# Patient Record
Sex: FEMALE | Race: BLACK OR AFRICAN AMERICAN | Employment: FULL TIME | URBAN - METROPOLITAN AREA
[De-identification: names, ages, dates, MRNs, and addresses within clinical notes are randomized per-mention and may not be internally consistent; named-entity substitution may affect disease eponyms.]

---

## 2017-10-27 ENCOUNTER — TELEPHONE (OUTPATIENT)
Dept: BARIATRICS/WEIGHT MGMT | Age: 42
End: 2017-10-27

## 2018-03-30 ENCOUNTER — OFFICE VISIT (OUTPATIENT)
Dept: BARIATRICS/WEIGHT MGMT | Age: 43
End: 2018-03-30
Payer: MEDICARE

## 2018-03-30 VITALS
HEART RATE: 80 BPM | WEIGHT: 293 LBS | HEIGHT: 63 IN | BODY MASS INDEX: 51.91 KG/M2 | DIASTOLIC BLOOD PRESSURE: 76 MMHG | SYSTOLIC BLOOD PRESSURE: 126 MMHG | RESPIRATION RATE: 20 BRPM

## 2018-03-30 DIAGNOSIS — K21.9 GASTROESOPHAGEAL REFLUX DISEASE WITHOUT ESOPHAGITIS: ICD-10-CM

## 2018-03-30 DIAGNOSIS — E66.01 MORBID OBESITY (HCC): ICD-10-CM

## 2018-03-30 DIAGNOSIS — G47.33 OBSTRUCTIVE SLEEP APNEA: Primary | ICD-10-CM

## 2018-03-30 PROCEDURE — G8417 CALC BMI ABV UP PARAM F/U: HCPCS | Performed by: SURGERY

## 2018-03-30 PROCEDURE — 99214 OFFICE O/P EST MOD 30 MIN: CPT | Performed by: SURGERY

## 2018-03-30 PROCEDURE — 1036F TOBACCO NON-USER: CPT | Performed by: SURGERY

## 2018-03-30 PROCEDURE — G8427 DOCREV CUR MEDS BY ELIG CLIN: HCPCS | Performed by: SURGERY

## 2018-03-30 PROCEDURE — G8484 FLU IMMUNIZE NO ADMIN: HCPCS | Performed by: SURGERY

## 2018-04-04 ENCOUNTER — NURSE ONLY (OUTPATIENT)
Dept: BARIATRICS/WEIGHT MGMT | Age: 43
End: 2018-04-04

## 2018-04-23 ENCOUNTER — OFFICE VISIT (OUTPATIENT)
Dept: INTERNAL MEDICINE | Age: 43
End: 2018-04-23
Payer: MEDICARE

## 2018-04-23 VITALS
OXYGEN SATURATION: 96 % | SYSTOLIC BLOOD PRESSURE: 137 MMHG | DIASTOLIC BLOOD PRESSURE: 85 MMHG | HEART RATE: 88 BPM | WEIGHT: 293 LBS | BODY MASS INDEX: 68.75 KG/M2

## 2018-04-23 DIAGNOSIS — N39.46 URINARY INCONTINENCE, MIXED: ICD-10-CM

## 2018-04-23 DIAGNOSIS — E66.01 MORBID OBESITY WITH BMI OF 60.0-69.9, ADULT (HCC): ICD-10-CM

## 2018-04-23 DIAGNOSIS — M25.562 ACUTE PAIN OF LEFT KNEE: Primary | ICD-10-CM

## 2018-04-23 DIAGNOSIS — E11.9 TYPE 2 DIABETES MELLITUS WITHOUT COMPLICATION, WITHOUT LONG-TERM CURRENT USE OF INSULIN (HCC): ICD-10-CM

## 2018-04-23 LAB — HBA1C MFR BLD: 6.5 %

## 2018-04-23 PROCEDURE — 99203 OFFICE O/P NEW LOW 30 MIN: CPT | Performed by: INTERNAL MEDICINE

## 2018-04-23 PROCEDURE — G8417 CALC BMI ABV UP PARAM F/U: HCPCS | Performed by: INTERNAL MEDICINE

## 2018-04-23 PROCEDURE — 3044F HG A1C LEVEL LT 7.0%: CPT | Performed by: INTERNAL MEDICINE

## 2018-04-23 PROCEDURE — 83036 HEMOGLOBIN GLYCOSYLATED A1C: CPT | Performed by: INTERNAL MEDICINE

## 2018-04-23 PROCEDURE — 1036F TOBACCO NON-USER: CPT | Performed by: INTERNAL MEDICINE

## 2018-04-23 PROCEDURE — 2022F DILAT RTA XM EVC RTNOPTHY: CPT | Performed by: INTERNAL MEDICINE

## 2018-04-23 PROCEDURE — G8427 DOCREV CUR MEDS BY ELIG CLIN: HCPCS | Performed by: INTERNAL MEDICINE

## 2018-04-23 RX ORDER — OXYCODONE HYDROCHLORIDE AND ACETAMINOPHEN 5; 325 MG/1; MG/1
1 TABLET ORAL EVERY 4 HOURS PRN
Status: ON HOLD | COMMUNITY
End: 2019-01-22 | Stop reason: HOSPADM

## 2018-04-23 ASSESSMENT — PATIENT HEALTH QUESTIONNAIRE - PHQ9
1. LITTLE INTEREST OR PLEASURE IN DOING THINGS: 0
2. FEELING DOWN, DEPRESSED OR HOPELESS: 0
SUM OF ALL RESPONSES TO PHQ QUESTIONS 1-9: 0
SUM OF ALL RESPONSES TO PHQ9 QUESTIONS 1 & 2: 0

## 2018-04-23 ASSESSMENT — ENCOUNTER SYMPTOMS
GASTROINTESTINAL NEGATIVE: 1
EYES NEGATIVE: 1
ALLERGIC/IMMUNOLOGIC NEGATIVE: 1

## 2018-05-02 ENCOUNTER — OFFICE VISIT (OUTPATIENT)
Dept: PULMONOLOGY | Age: 43
End: 2018-05-02
Payer: MEDICARE

## 2018-05-02 VITALS
HEART RATE: 89 BPM | SYSTOLIC BLOOD PRESSURE: 126 MMHG | WEIGHT: 293 LBS | OXYGEN SATURATION: 97 % | RESPIRATION RATE: 16 BRPM | BODY MASS INDEX: 51.91 KG/M2 | TEMPERATURE: 97.1 F | DIASTOLIC BLOOD PRESSURE: 83 MMHG | HEIGHT: 63 IN

## 2018-05-02 DIAGNOSIS — K21.9 GASTROESOPHAGEAL REFLUX DISEASE, ESOPHAGITIS PRESENCE NOT SPECIFIED: ICD-10-CM

## 2018-05-02 DIAGNOSIS — G47.33 OSA (OBSTRUCTIVE SLEEP APNEA): ICD-10-CM

## 2018-05-02 DIAGNOSIS — E66.01 MORBID OBESITY DUE TO EXCESS CALORIES (HCC): Primary | ICD-10-CM

## 2018-05-02 PROCEDURE — G8427 DOCREV CUR MEDS BY ELIG CLIN: HCPCS | Performed by: INTERNAL MEDICINE

## 2018-05-02 PROCEDURE — 99244 OFF/OP CNSLTJ NEW/EST MOD 40: CPT | Performed by: INTERNAL MEDICINE

## 2018-05-02 PROCEDURE — G8417 CALC BMI ABV UP PARAM F/U: HCPCS | Performed by: INTERNAL MEDICINE

## 2018-05-04 DIAGNOSIS — M25.561 ACUTE PAIN OF RIGHT KNEE: Primary | ICD-10-CM

## 2018-05-07 ENCOUNTER — OFFICE VISIT (OUTPATIENT)
Dept: ORTHOPEDIC SURGERY | Age: 43
End: 2018-05-07
Payer: MEDICARE

## 2018-05-07 VITALS — BODY MASS INDEX: 51.91 KG/M2 | HEIGHT: 63 IN | WEIGHT: 293 LBS

## 2018-05-07 DIAGNOSIS — M25.562 LEFT KNEE PAIN, UNSPECIFIED CHRONICITY: Primary | ICD-10-CM

## 2018-05-07 PROCEDURE — 1036F TOBACCO NON-USER: CPT | Performed by: STUDENT IN AN ORGANIZED HEALTH CARE EDUCATION/TRAINING PROGRAM

## 2018-05-07 PROCEDURE — G8417 CALC BMI ABV UP PARAM F/U: HCPCS | Performed by: STUDENT IN AN ORGANIZED HEALTH CARE EDUCATION/TRAINING PROGRAM

## 2018-05-07 PROCEDURE — 99203 OFFICE O/P NEW LOW 30 MIN: CPT | Performed by: STUDENT IN AN ORGANIZED HEALTH CARE EDUCATION/TRAINING PROGRAM

## 2018-05-07 PROCEDURE — G8427 DOCREV CUR MEDS BY ELIG CLIN: HCPCS | Performed by: STUDENT IN AN ORGANIZED HEALTH CARE EDUCATION/TRAINING PROGRAM

## 2018-05-09 ENCOUNTER — OFFICE VISIT (OUTPATIENT)
Dept: BARIATRICS/WEIGHT MGMT | Age: 43
End: 2018-05-09
Payer: MEDICARE

## 2018-05-09 VITALS
HEART RATE: 78 BPM | HEIGHT: 63 IN | BODY MASS INDEX: 51.91 KG/M2 | SYSTOLIC BLOOD PRESSURE: 124 MMHG | WEIGHT: 293 LBS | DIASTOLIC BLOOD PRESSURE: 78 MMHG | RESPIRATION RATE: 22 BRPM

## 2018-05-09 DIAGNOSIS — E11.69 DIABETES MELLITUS TYPE 2 IN OBESE (HCC): Primary | ICD-10-CM

## 2018-05-09 DIAGNOSIS — G89.29 CHRONIC PAIN OF LEFT KNEE: ICD-10-CM

## 2018-05-09 DIAGNOSIS — K80.20 CALCULUS OF GALLBLADDER WITHOUT CHOLECYSTITIS WITHOUT OBSTRUCTION: ICD-10-CM

## 2018-05-09 DIAGNOSIS — M25.562 CHRONIC PAIN OF LEFT KNEE: ICD-10-CM

## 2018-05-09 DIAGNOSIS — M25.559 ARTHRALGIA OF HIP, UNSPECIFIED LATERALITY: ICD-10-CM

## 2018-05-09 DIAGNOSIS — E66.01 MORBID OBESITY WITH BMI OF 60.0-69.9, ADULT (HCC): ICD-10-CM

## 2018-05-09 DIAGNOSIS — R20.2 NUMBNESS AND TINGLING IN BOTH HANDS: ICD-10-CM

## 2018-05-09 DIAGNOSIS — R20.0 NUMBNESS AND TINGLING IN BOTH HANDS: ICD-10-CM

## 2018-05-09 DIAGNOSIS — D50.9 IRON DEFICIENCY ANEMIA, UNSPECIFIED IRON DEFICIENCY ANEMIA TYPE: ICD-10-CM

## 2018-05-09 DIAGNOSIS — R55 NEUROCARDIOGENIC SYNCOPE: ICD-10-CM

## 2018-05-09 DIAGNOSIS — E66.9 DIABETES MELLITUS TYPE 2 IN OBESE (HCC): Primary | ICD-10-CM

## 2018-05-09 DIAGNOSIS — G47.33 OSA (OBSTRUCTIVE SLEEP APNEA): ICD-10-CM

## 2018-05-09 PROCEDURE — G8427 DOCREV CUR MEDS BY ELIG CLIN: HCPCS | Performed by: NURSE PRACTITIONER

## 2018-05-09 PROCEDURE — G8417 CALC BMI ABV UP PARAM F/U: HCPCS | Performed by: NURSE PRACTITIONER

## 2018-05-09 PROCEDURE — 1036F TOBACCO NON-USER: CPT | Performed by: NURSE PRACTITIONER

## 2018-05-09 PROCEDURE — 99213 OFFICE O/P EST LOW 20 MIN: CPT | Performed by: NURSE PRACTITIONER

## 2018-05-09 PROCEDURE — 3044F HG A1C LEVEL LT 7.0%: CPT | Performed by: NURSE PRACTITIONER

## 2018-05-09 PROCEDURE — 2022F DILAT RTA XM EVC RTNOPTHY: CPT | Performed by: NURSE PRACTITIONER

## 2018-05-21 ENCOUNTER — HOSPITAL ENCOUNTER (OUTPATIENT)
Age: 43
Setting detail: SPECIMEN
Discharge: HOME | End: 2018-05-21
Payer: MEDICARE

## 2018-05-21 DIAGNOSIS — E66.01 MORBID OBESITY WITH BMI OF 60.0-69.9, ADULT (HCC): ICD-10-CM

## 2018-05-21 DIAGNOSIS — G47.33 OBSTRUCTIVE SLEEP APNEA: ICD-10-CM

## 2018-05-21 DIAGNOSIS — E11.9 TYPE 2 DIABETES MELLITUS WITHOUT COMPLICATION, WITHOUT LONG-TERM CURRENT USE OF INSULIN (HCC): ICD-10-CM

## 2018-05-21 DIAGNOSIS — K21.9 GASTROESOPHAGEAL REFLUX DISEASE WITHOUT ESOPHAGITIS: ICD-10-CM

## 2018-05-21 DIAGNOSIS — E66.01 MORBID OBESITY (HCC): ICD-10-CM

## 2018-05-21 DIAGNOSIS — N39.46 URINARY INCONTINENCE, MIXED: ICD-10-CM

## 2018-05-21 LAB
-: ABNORMAL
ABSOLUTE EOS #: 0.07 K/UL (ref 0–0.44)
ABSOLUTE EOS #: 0.07 K/UL (ref 0–0.44)
ABSOLUTE IMMATURE GRANULOCYTE: <0.03 K/UL (ref 0–0.3)
ABSOLUTE IMMATURE GRANULOCYTE: <0.03 K/UL (ref 0–0.3)
ABSOLUTE LYMPH #: 2.21 K/UL (ref 1.1–3.7)
ABSOLUTE LYMPH #: 2.21 K/UL (ref 1.1–3.7)
ABSOLUTE MONO #: 0.32 K/UL (ref 0.1–1.2)
ABSOLUTE MONO #: 0.32 K/UL (ref 0.1–1.2)
ALBUMIN SERPL-MCNC: 3.9 G/DL (ref 3.5–5.2)
ALBUMIN SERPL-MCNC: 3.9 G/DL (ref 3.5–5.2)
ALBUMIN/GLOBULIN RATIO: 1 (ref 1–2.5)
ALBUMIN/GLOBULIN RATIO: 1 (ref 1–2.5)
ALP BLD-CCNC: 77 U/L (ref 35–104)
ALP BLD-CCNC: 77 U/L (ref 35–104)
ALT SERPL-CCNC: 61 U/L (ref 5–33)
ALT SERPL-CCNC: 61 U/L (ref 5–33)
AMORPHOUS: ABNORMAL
AMPHETAMINE SCREEN URINE: NEGATIVE
ANION GAP SERPL CALCULATED.3IONS-SCNC: 11 MMOL/L (ref 9–17)
ANION GAP SERPL CALCULATED.3IONS-SCNC: 11 MMOL/L (ref 9–17)
AST SERPL-CCNC: 55 U/L
AST SERPL-CCNC: 55 U/L
BACTERIA: ABNORMAL
BARBITURATE SCREEN URINE: NEGATIVE
BASOPHILS # BLD: 1 % (ref 0–2)
BASOPHILS # BLD: 1 % (ref 0–2)
BASOPHILS ABSOLUTE: 0.04 K/UL (ref 0–0.2)
BASOPHILS ABSOLUTE: 0.04 K/UL (ref 0–0.2)
BENZODIAZEPINE SCREEN, URINE: NEGATIVE
BILIRUB SERPL-MCNC: 0.36 MG/DL (ref 0.3–1.2)
BILIRUB SERPL-MCNC: 0.36 MG/DL (ref 0.3–1.2)
BILIRUBIN URINE: NEGATIVE
BUN BLDV-MCNC: 9 MG/DL (ref 6–20)
BUN BLDV-MCNC: 9 MG/DL (ref 6–20)
BUN/CREAT BLD: ABNORMAL (ref 9–20)
BUN/CREAT BLD: ABNORMAL (ref 9–20)
BUPRENORPHINE URINE: ABNORMAL
CALCIUM SERPL-MCNC: 9 MG/DL (ref 8.6–10.4)
CALCIUM SERPL-MCNC: 9 MG/DL (ref 8.6–10.4)
CANNABINOID SCREEN URINE: NEGATIVE
CASTS UA: ABNORMAL /LPF (ref 0–8)
CHLORIDE BLD-SCNC: 105 MMOL/L (ref 98–107)
CHLORIDE BLD-SCNC: 105 MMOL/L (ref 98–107)
CHOLESTEROL/HDL RATIO: 3.7
CHOLESTEROL: 187 MG/DL
CO2: 25 MMOL/L (ref 20–31)
CO2: 25 MMOL/L (ref 20–31)
COCAINE METABOLITE, URINE: NEGATIVE
COLOR: YELLOW
COMMENT UA: ABNORMAL
CREAT SERPL-MCNC: 0.69 MG/DL (ref 0.5–0.9)
CREAT SERPL-MCNC: 0.69 MG/DL (ref 0.5–0.9)
CRYSTALS, UA: ABNORMAL /HPF
DIFFERENTIAL TYPE: ABNORMAL
DIFFERENTIAL TYPE: ABNORMAL
EOSINOPHILS RELATIVE PERCENT: 1 % (ref 1–4)
EOSINOPHILS RELATIVE PERCENT: 1 % (ref 1–4)
EPITHELIAL CELLS UA: ABNORMAL /HPF (ref 0–5)
FERRITIN: 70 UG/L (ref 13–150)
FOLATE: 8.8 NG/ML
GFR AFRICAN AMERICAN: >60 ML/MIN
GFR AFRICAN AMERICAN: >60 ML/MIN
GFR NON-AFRICAN AMERICAN: >60 ML/MIN
GFR NON-AFRICAN AMERICAN: >60 ML/MIN
GFR SERPL CREATININE-BSD FRML MDRD: ABNORMAL ML/MIN/{1.73_M2}
GLUCOSE BLD-MCNC: 88 MG/DL (ref 70–99)
GLUCOSE BLD-MCNC: 88 MG/DL (ref 70–99)
GLUCOSE URINE: NEGATIVE
HCT VFR BLD CALC: 41.5 % (ref 36.3–47.1)
HCT VFR BLD CALC: 41.5 % (ref 36.3–47.1)
HDLC SERPL-MCNC: 51 MG/DL
HEMOGLOBIN: 12.3 G/DL (ref 11.9–15.1)
HEMOGLOBIN: 12.3 G/DL (ref 11.9–15.1)
IMMATURE GRANULOCYTES: 0 %
IMMATURE GRANULOCYTES: 0 %
KETONES, URINE: NEGATIVE
LDL CHOLESTEROL: 118 MG/DL (ref 0–130)
LEUKOCYTE ESTERASE, URINE: NEGATIVE
LYMPHOCYTES # BLD: 36 % (ref 24–43)
LYMPHOCYTES # BLD: 36 % (ref 24–43)
MAGNESIUM: 1.7 MG/DL (ref 1.6–2.6)
MCH RBC QN AUTO: 26.3 PG (ref 25.2–33.5)
MCH RBC QN AUTO: 26.3 PG (ref 25.2–33.5)
MCHC RBC AUTO-ENTMCNC: 29.6 G/DL (ref 28.4–34.8)
MCHC RBC AUTO-ENTMCNC: 29.6 G/DL (ref 28.4–34.8)
MCV RBC AUTO: 88.9 FL (ref 82.6–102.9)
MCV RBC AUTO: 88.9 FL (ref 82.6–102.9)
MDMA URINE: ABNORMAL
METHADONE SCREEN, URINE: NEGATIVE
METHAMPHETAMINE, URINE: ABNORMAL
MONOCYTES # BLD: 5 % (ref 3–12)
MONOCYTES # BLD: 5 % (ref 3–12)
MUCUS: ABNORMAL
NITRITE, URINE: NEGATIVE
NRBC AUTOMATED: 0 PER 100 WBC
NRBC AUTOMATED: 0 PER 100 WBC
OPIATES, URINE: NEGATIVE
OTHER OBSERVATIONS UA: ABNORMAL
OXYCODONE SCREEN URINE: POSITIVE
PDW BLD-RTO: 14.3 % (ref 11.8–14.4)
PDW BLD-RTO: 14.3 % (ref 11.8–14.4)
PH UA: 5.5 (ref 5–8)
PHENCYCLIDINE, URINE: NEGATIVE
PLATELET # BLD: 471 K/UL (ref 138–453)
PLATELET # BLD: 471 K/UL (ref 138–453)
PLATELET ESTIMATE: ABNORMAL
PLATELET ESTIMATE: ABNORMAL
PMV BLD AUTO: 10.2 FL (ref 8.1–13.5)
PMV BLD AUTO: 10.2 FL (ref 8.1–13.5)
POTASSIUM SERPL-SCNC: 4.4 MMOL/L (ref 3.7–5.3)
POTASSIUM SERPL-SCNC: 4.4 MMOL/L (ref 3.7–5.3)
PROPOXYPHENE, URINE: ABNORMAL
PROTEIN UA: ABNORMAL
PTH INTACT: 85.08 PG/ML (ref 15–65)
RBC # BLD: 4.67 M/UL (ref 3.95–5.11)
RBC # BLD: 4.67 M/UL (ref 3.95–5.11)
RBC # BLD: ABNORMAL 10*6/UL
RBC # BLD: ABNORMAL 10*6/UL
RBC UA: ABNORMAL /HPF (ref 0–4)
RENAL EPITHELIAL, UA: ABNORMAL /HPF
SEG NEUTROPHILS: 57 % (ref 36–65)
SEG NEUTROPHILS: 57 % (ref 36–65)
SEGMENTED NEUTROPHILS ABSOLUTE COUNT: 3.47 K/UL (ref 1.5–8.1)
SEGMENTED NEUTROPHILS ABSOLUTE COUNT: 3.47 K/UL (ref 1.5–8.1)
SODIUM BLD-SCNC: 141 MMOL/L (ref 135–144)
SODIUM BLD-SCNC: 141 MMOL/L (ref 135–144)
SPECIFIC GRAVITY UA: 1.03 (ref 1–1.03)
TEST INFORMATION: ABNORMAL
THYROXINE, FREE: 1.26 NG/DL (ref 0.93–1.7)
TOTAL PROTEIN: 7.8 G/DL (ref 6.4–8.3)
TOTAL PROTEIN: 7.8 G/DL (ref 6.4–8.3)
TRICHOMONAS: ABNORMAL
TRICYCLIC ANTIDEPRESSANTS, UR: ABNORMAL
TRIGL SERPL-MCNC: 90 MG/DL
TSH SERPL DL<=0.05 MIU/L-ACNC: 1.64 MIU/L (ref 0.3–5)
TURBIDITY: ABNORMAL
URINE HGB: ABNORMAL
UROBILINOGEN, URINE: NORMAL
VITAMIN B-12: 340 PG/ML (ref 232–1245)
VLDLC SERPL CALC-MCNC: NORMAL MG/DL (ref 1–30)
WBC # BLD: 6.1 K/UL (ref 3.5–11.3)
WBC # BLD: 6.1 K/UL (ref 3.5–11.3)
WBC # BLD: ABNORMAL 10*3/UL
WBC # BLD: ABNORMAL 10*3/UL
WBC UA: ABNORMAL /HPF (ref 0–5)
YEAST: ABNORMAL

## 2018-05-22 LAB
CULTURE: NORMAL
CULTURE: NORMAL
ESTIMATED AVERAGE GLUCOSE: 123 MG/DL
HBA1C MFR BLD: 5.9 % (ref 4–6)
IRON SATURATION: 15 % (ref 20–55)
IRON: 49 UG/DL (ref 37–145)
Lab: NORMAL
SPECIMEN DESCRIPTION: NORMAL
STATUS: NORMAL
TOTAL IRON BINDING CAPACITY: 320 UG/DL (ref 250–450)
UNSATURATED IRON BINDING CAPACITY: 271 UG/DL (ref 112–347)
VITAMIN D 25-HYDROXY: 19.5 NG/ML (ref 30–100)

## 2018-05-23 LAB
RETINYL PALMITATE: <0.02 MG/L (ref 0–0.1)
VITAMIN A LEVEL: 0.64 MG/L (ref 0.3–1.2)
VITAMIN A, INTERP: NORMAL
ZINC: 72 UG/DL (ref 60–120)

## 2018-05-25 LAB — VITAMIN B1 WHOLE BLOOD: 81 NMOL/L (ref 70–180)

## 2018-06-06 ENCOUNTER — OFFICE VISIT (OUTPATIENT)
Dept: BARIATRICS/WEIGHT MGMT | Age: 43
End: 2018-06-06
Payer: MEDICARE

## 2018-06-06 VITALS
BODY MASS INDEX: 51.91 KG/M2 | DIASTOLIC BLOOD PRESSURE: 82 MMHG | HEART RATE: 74 BPM | HEIGHT: 63 IN | WEIGHT: 293 LBS | RESPIRATION RATE: 24 BRPM | SYSTOLIC BLOOD PRESSURE: 126 MMHG

## 2018-06-06 DIAGNOSIS — G89.29 CHRONIC PAIN OF LEFT KNEE: ICD-10-CM

## 2018-06-06 DIAGNOSIS — E66.01 MORBID OBESITY WITH BMI OF 60.0-69.9, ADULT (HCC): ICD-10-CM

## 2018-06-06 DIAGNOSIS — M25.562 CHRONIC PAIN OF LEFT KNEE: ICD-10-CM

## 2018-06-06 DIAGNOSIS — E11.69 DIABETES MELLITUS TYPE 2 IN OBESE (HCC): Primary | ICD-10-CM

## 2018-06-06 DIAGNOSIS — R55 NEUROCARDIOGENIC SYNCOPE: ICD-10-CM

## 2018-06-06 DIAGNOSIS — K80.20 CALCULUS OF GALLBLADDER WITHOUT CHOLECYSTITIS WITHOUT OBSTRUCTION: ICD-10-CM

## 2018-06-06 DIAGNOSIS — G47.33 OSA (OBSTRUCTIVE SLEEP APNEA): ICD-10-CM

## 2018-06-06 DIAGNOSIS — D50.9 IRON DEFICIENCY ANEMIA, UNSPECIFIED IRON DEFICIENCY ANEMIA TYPE: ICD-10-CM

## 2018-06-06 DIAGNOSIS — E66.9 DIABETES MELLITUS TYPE 2 IN OBESE (HCC): Primary | ICD-10-CM

## 2018-06-06 PROCEDURE — 1036F TOBACCO NON-USER: CPT | Performed by: NURSE PRACTITIONER

## 2018-06-06 PROCEDURE — G8427 DOCREV CUR MEDS BY ELIG CLIN: HCPCS | Performed by: NURSE PRACTITIONER

## 2018-06-06 PROCEDURE — 99213 OFFICE O/P EST LOW 20 MIN: CPT | Performed by: NURSE PRACTITIONER

## 2018-06-06 PROCEDURE — 2022F DILAT RTA XM EVC RTNOPTHY: CPT | Performed by: NURSE PRACTITIONER

## 2018-06-06 PROCEDURE — 3044F HG A1C LEVEL LT 7.0%: CPT | Performed by: NURSE PRACTITIONER

## 2018-06-06 PROCEDURE — G8417 CALC BMI ABV UP PARAM F/U: HCPCS | Performed by: NURSE PRACTITIONER

## 2018-06-06 RX ORDER — ERGOCALCIFEROL 1.25 MG/1
50000 CAPSULE ORAL WEEKLY
Qty: 8 CAPSULE | Refills: 0 | Status: SHIPPED | OUTPATIENT
Start: 2018-06-06 | End: 2019-01-09

## 2018-06-21 ENCOUNTER — TELEPHONE (OUTPATIENT)
Dept: PULMONOLOGY | Age: 43
End: 2018-06-21

## 2018-06-21 DIAGNOSIS — G47.33 OSA (OBSTRUCTIVE SLEEP APNEA): Primary | ICD-10-CM

## 2018-07-09 ENCOUNTER — OFFICE VISIT (OUTPATIENT)
Dept: BARIATRICS/WEIGHT MGMT | Age: 43
End: 2018-07-09
Payer: MEDICARE

## 2018-07-09 VITALS
BODY MASS INDEX: 51.91 KG/M2 | HEART RATE: 80 BPM | DIASTOLIC BLOOD PRESSURE: 68 MMHG | WEIGHT: 293 LBS | SYSTOLIC BLOOD PRESSURE: 106 MMHG | HEIGHT: 63 IN

## 2018-07-09 DIAGNOSIS — M25.562 CHRONIC PAIN OF LEFT KNEE: ICD-10-CM

## 2018-07-09 DIAGNOSIS — G47.33 OSA (OBSTRUCTIVE SLEEP APNEA): ICD-10-CM

## 2018-07-09 DIAGNOSIS — K80.20 CALCULUS OF GALLBLADDER WITHOUT CHOLECYSTITIS WITHOUT OBSTRUCTION: ICD-10-CM

## 2018-07-09 DIAGNOSIS — G89.29 CHRONIC PAIN OF LEFT KNEE: ICD-10-CM

## 2018-07-09 DIAGNOSIS — E11.69 DIABETES MELLITUS TYPE 2 IN OBESE (HCC): Primary | ICD-10-CM

## 2018-07-09 DIAGNOSIS — R55 NEUROCARDIOGENIC SYNCOPE: ICD-10-CM

## 2018-07-09 DIAGNOSIS — D50.9 IRON DEFICIENCY ANEMIA, UNSPECIFIED IRON DEFICIENCY ANEMIA TYPE: ICD-10-CM

## 2018-07-09 DIAGNOSIS — E66.01 MORBID OBESITY WITH BMI OF 60.0-69.9, ADULT (HCC): ICD-10-CM

## 2018-07-09 DIAGNOSIS — E66.9 DIABETES MELLITUS TYPE 2 IN OBESE (HCC): Primary | ICD-10-CM

## 2018-07-09 PROCEDURE — G8427 DOCREV CUR MEDS BY ELIG CLIN: HCPCS | Performed by: NURSE PRACTITIONER

## 2018-07-09 PROCEDURE — 1036F TOBACCO NON-USER: CPT | Performed by: NURSE PRACTITIONER

## 2018-07-09 PROCEDURE — 2022F DILAT RTA XM EVC RTNOPTHY: CPT | Performed by: NURSE PRACTITIONER

## 2018-07-09 PROCEDURE — G8417 CALC BMI ABV UP PARAM F/U: HCPCS | Performed by: NURSE PRACTITIONER

## 2018-07-09 PROCEDURE — 99213 OFFICE O/P EST LOW 20 MIN: CPT | Performed by: NURSE PRACTITIONER

## 2018-07-09 PROCEDURE — 3044F HG A1C LEVEL LT 7.0%: CPT | Performed by: NURSE PRACTITIONER

## 2018-07-09 NOTE — PROGRESS NOTES
Medical Nutrition Therapy   Metabolic and Bariatric Surgery         Supervised diet and exercise preparation  Visit 3 out of 3  Pt reports:  Brought both education binders; goal card from last visit pt cannot locate; brought exercise log; reviewed visits from 2015 and pt was able to complete all goals in timely fashion; reports that now her life has become much busier and so struggling to meet goals at times; as pt on third visit did not update with new goal card    Pt currently following structured meal plan 8pro/3veg/2fr/6 starch/3fat from education binder diet for weight management. Reviewed with pt. Vitals: Wt Readings from Last 3 Encounters:   07/09/18 (!) 381 lb (172.8 kg)   06/06/18 (!) 386 lb (175.1 kg)   05/09/18 (!) 384 lb (174.2 kg)     lost 5 lbs over one month      Nutrition Assessment:   PES: Knowledge deficit related to healthy behaviors that support weight management post weight loss surgery as evidenced by Body mass index is 67.49 kg/m². Nutrition Assessment of Goal Attainment:  TREATMENT GOALS:    1. Pt  Completed 0 out of 0 goals. 2.TREATMENT GOALS FOR UPCOMING WEEK: continue all previous goals and add: # 0    All goals were planned with and agreed on by the patient. Goal Card  Name                                                                                                                           [id number], [address]  1. I will read the entire patient educational binder provided to me prior to my second appointment at [facility]. 2. I will make my psychological evaluation appointment prior to my second appointment at [facility]. 3. I will bring this tracking tool to every appointment with a health care provider at [facility]. 4. I will eliminate all nicotine, tobacco and e-cigarettes prior to surgery. 5. I will limit alcoholic beverages prior to surgery to 1 mixed drink or glass of wine (4-6oz). 6. I will limit dining out including fast food to 3 times a week prior to surgery.

## 2018-07-09 NOTE — PROGRESS NOTES
Medical Weight Management Progress Note    Subjective     Patient being seen for medically supervised weight loss for the chronic conditions of DM Type 2, Gallstones, Neurocardiogenic syncope, Anemia, ZAID, Arthritis, Chronic Pain Left Knee. She is a restart. She is working on the behavior changes discussed at the initial appointment. Patient continues on diet plan. Physical activity includes walking. Weight loss of 5 lbs since last visit. Diabetes regimen includes metformin. Last A1C 6.5%. Waiting for CPAP. Psych eval completed and clearance received. No current issues. Working toward bariatric surgery:    [x] Sleeve Gastrectomy                                                           [] Lucas-en-Y Gastric Bypass    Allergies: Allergies   Allergen Reactions    Motrin [Ibuprofen]      Pt states that it causes her stomach to bleed    Shellfish-Derived Products Hives and Itching       Past Medical History:     Past Medical History:   Diagnosis Date    Anemia 9/18/2014    Chronic back pain     GERD (gastroesophageal reflux disease)     Hip pain, chronic     Bilateral, bilat surgery as child    Hyperlipidemia 1/20/2015    Morbid obesity ([address]     ZAID (obstructive sleep apnea)     Osteoarthritis     Syncope     Trichomonas contact, treated     Urinary incontinence    .     Past Surgical History:  Past Surgical History:   Procedure Laterality Date    HIP SURGERY      at age15 with pins/rods Bilateral       Family History:  Family History   Problem Relation Age of Onset    Hypertension Father     High Blood Pressure Father     Diabetes Mother     Hypertension Mother    [facility] Arthritis Mother     Depression Mother     High Blood Pressure Mother     High Cholesterol Mother     Diabetes Maternal Uncle     Diabetes Maternal Aunt     High Cholesterol Brother     Mental Illness Brother     Breast Cancer Neg Hx     Cancer Neg Hx     Colon Cancer Neg Hx     Eclampsia Neg Hx     Ovarian Cancer Neg Hx      Labor Neg Hx     Spont Abortions Neg Hx     Stroke Neg Hx        Social History:  Social History     Social History    Marital status:      Spouse name: N/A    Number of children: N/A    Years of education: N/A     Occupational History     N/A     Social History Main Topics    Smoking status: Former Smoker     Packs/day: 1.00     Years: 5.00     Types: Cigars     Quit date: 2013    Smokeless tobacco: Never Used    Alcohol use No    Drug use: No    Sexual activity: Yes     Partners: Male     Other Topics Concern    Not on file     Social History Narrative    No narrative on file       Current Medications:  Current Outpatient Prescriptions   Medication Sig Dispense Refill    vitamin D (ERGOCALCIFEROL) 83966 units CAPS capsule Take 1 capsule by mouth once a week for 8 doses 8 capsule 0    oxyCODONE-acetaminophen (PERCOCET) 5-325 MG per tablet Take 1 tablet by mouth every 4 hours as needed for Pain. Patricia Bajwa metFORMIN (GLUCOPHAGE) 500 MG tablet Take 2 tablets by mouth 2 times daily (with meals) 120 tablet 5     No current facility-administered medications for this visit. Vital Signs:  /68   Pulse 80   Ht 5' 3\" (1.6 m)   Wt (!) 381 lb (172.8 kg)   BMI 67.49 kg/m²     BMI/Height/Weight:  Body mass index is 67.49 kg/m². Review of Systems - A review of systems was performed. All was negative unless otherwise documented in HPI. Constitutional: Negative for fever, chills and diaphoresis. HENT: Negative for hearing loss and trouble swallowing. Eyes: Negative for photophobia and visual disturbance. Respiratory: Negative for cough, shortness of breath and wheezing. Cardiovascular: Negative for chest pain and palpitations. Gastrointestinal: Negative for nausea, vomiting, abdominal pain, diarrhea, constipation, blood in stool and abdominal distention. Endocrine: Negative for polydipsia, polyphagia and polyuria.    Genitourinary: Negative for dysuria, frequency, hematuria and difficulty urinating. Musculoskeletal: Negative for myalgias, joint swelling. Skin: Negative for pallor and rash. Neurological: Negative for dizziness, tremors, light-headedness and headaches. Psychiatric/Behavioral: Negative for sleep disturbance and dysphoric mood. Objective:      Physical Exam   Vital signs reviewed. General: Well-developed and well-nourished. No acute distress. Skin: Warm, dry and intact. HEENT: Normocephalic. EOMs intact. Conjunctivae normal. Neck supple. Cardiovascular: Normal rate, regular rhythm. No murmur. Pulmonary/Chest: Normal effort. Lungs clear to auscultation. No rales, rhonchi or wheezing. Abdominal: Positive bowel sounds. Soft, nontender. Nondistended. Musculoskeletal: Movement x4. No edema. Neurological: Gait normal. Alert and oriented to person, place, and time. Psychiatric: Normal mood and affect. Speech and behavior normal. Judgment and thought content normal. Cognition and memory intact. Assessment:       Diagnosis Orders   1. Diabetes mellitus type 2 in obese ([address]     2. ZAID (obstructive sleep apnea)     3. Chronic pain of left knee     4. Neurocardiogenic syncope     5. Calculus of gallbladder without cholecystitis without obstruction     6. Iron deficiency anemia, unspecified iron deficiency anemia type     7. Morbid obesity with BMI of 60.0-69.9, adult [facility])         Plan:    Dietitian visit today. Patient was encouraged to journal all food intake. Keep calorie level at approximately 8235-6902. Protein intake is to be a minimum of 60-80 grams per day. Water drinking was encouraged with a goal of 64oz-128oz daily. Beverages to be calorie free except for milk. Every other beverage should be water. Avoid soda. Continue to increase level of physical activity. Encouraged use of exercise log. Follow-up  Return in about 1 month (around 8/9/2018).     Orders this encounter:  No orders of the defined types were placed in this encounter. Prescriptions this encounter:  No orders of the defined types were placed in this encounter.       Electronically signed by:  Elysia Evans CNP

## 2018-07-11 ENCOUNTER — HOSPITAL ENCOUNTER (OUTPATIENT)
Dept: NON INVASIVE DIAGNOSTICS | Age: 43
Discharge: HOME | End: 2018-07-11
Payer: MEDICARE

## 2018-07-11 LAB
LV EF: 58 %
LVEF MODALITY: NORMAL

## 2018-07-11 PROCEDURE — 93306 TTE W/DOPPLER COMPLETE: CPT

## 2018-07-18 ENCOUNTER — APPOINTMENT (OUTPATIENT)
Dept: SLEEP CENTER | Age: 43
End: 2018-07-18
Payer: MEDICARE

## 2018-07-18 ENCOUNTER — HOSPITAL ENCOUNTER (OUTPATIENT)
Dept: SLEEP CENTER | Age: 43
Discharge: HOME | End: 2018-07-20
Payer: MEDICARE

## 2018-07-18 VITALS — WEIGHT: 293 LBS | HEIGHT: 63 IN | HEART RATE: 75 BPM | BODY MASS INDEX: 51.91 KG/M2 | RESPIRATION RATE: 24 BRPM

## 2018-07-18 DIAGNOSIS — G47.33 OSA (OBSTRUCTIVE SLEEP APNEA): ICD-10-CM

## 2018-07-18 PROCEDURE — 95810 POLYSOM 6/> YRS 4/> PARAM: CPT

## 2018-07-27 LAB — STATUS: NORMAL

## 2018-08-19 ENCOUNTER — HOSPITAL ENCOUNTER (OUTPATIENT)
Dept: SLEEP CENTER | Facility: CLINIC | Age: 43
Discharge: HOME | End: 2018-08-21
Payer: MEDICARE

## 2018-08-19 DIAGNOSIS — G47.33 OSA (OBSTRUCTIVE SLEEP APNEA): ICD-10-CM

## 2018-08-19 PROCEDURE — 95811 POLYSOM 6/>YRS CPAP 4/> PARM: CPT

## 2018-08-20 VITALS — HEIGHT: 63 IN | WEIGHT: 293 LBS | HEART RATE: 78 BPM | BODY MASS INDEX: 51.91 KG/M2 | RESPIRATION RATE: 20 BRPM

## 2018-08-22 ENCOUNTER — OFFICE VISIT (OUTPATIENT)
Dept: BARIATRICS/WEIGHT MGMT | Age: 43
End: 2018-08-22
Payer: MEDICARE

## 2018-08-22 ENCOUNTER — APPOINTMENT (OUTPATIENT)
Dept: GENERAL RADIOLOGY | Age: 43
End: 2018-08-22
Payer: MEDICARE

## 2018-08-22 ENCOUNTER — HOSPITAL ENCOUNTER (EMERGENCY)
Age: 43
Discharge: HOME | End: 2018-08-22
Attending: EMERGENCY MEDICINE
Payer: MEDICARE

## 2018-08-22 VITALS
HEIGHT: 63 IN | SYSTOLIC BLOOD PRESSURE: 120 MMHG | WEIGHT: 293 LBS | HEART RATE: 74 BPM | RESPIRATION RATE: 22 BRPM | DIASTOLIC BLOOD PRESSURE: 78 MMHG | BODY MASS INDEX: 51.91 KG/M2

## 2018-08-22 VITALS
OXYGEN SATURATION: 99 % | RESPIRATION RATE: 16 BRPM | WEIGHT: 293 LBS | HEIGHT: 62 IN | BODY MASS INDEX: 53.92 KG/M2 | HEART RATE: 74 BPM | DIASTOLIC BLOOD PRESSURE: 60 MMHG | SYSTOLIC BLOOD PRESSURE: 120 MMHG

## 2018-08-22 DIAGNOSIS — M16.0 BILATERAL HIP JOINT ARTHRITIS: Primary | ICD-10-CM

## 2018-08-22 DIAGNOSIS — M25.559 ARTHRALGIA OF HIP, UNSPECIFIED LATERALITY: ICD-10-CM

## 2018-08-22 DIAGNOSIS — G89.29 CHRONIC PAIN OF LEFT KNEE: ICD-10-CM

## 2018-08-22 DIAGNOSIS — G47.33 OSA (OBSTRUCTIVE SLEEP APNEA): Primary | ICD-10-CM

## 2018-08-22 DIAGNOSIS — E66.9 DIABETES MELLITUS TYPE 2 IN OBESE (HCC): ICD-10-CM

## 2018-08-22 DIAGNOSIS — D50.9 IRON DEFICIENCY ANEMIA, UNSPECIFIED IRON DEFICIENCY ANEMIA TYPE: ICD-10-CM

## 2018-08-22 DIAGNOSIS — M25.562 CHRONIC PAIN OF LEFT KNEE: ICD-10-CM

## 2018-08-22 DIAGNOSIS — R55 NEUROCARDIOGENIC SYNCOPE: ICD-10-CM

## 2018-08-22 DIAGNOSIS — E66.01 MORBID OBESITY WITH BMI OF 60.0-69.9, ADULT (HCC): ICD-10-CM

## 2018-08-22 DIAGNOSIS — E11.69 DIABETES MELLITUS TYPE 2 IN OBESE (HCC): ICD-10-CM

## 2018-08-22 DIAGNOSIS — K80.20 CALCULUS OF GALLBLADDER WITHOUT CHOLECYSTITIS WITHOUT OBSTRUCTION: ICD-10-CM

## 2018-08-22 LAB
CHP ED QC CHECK: YES
PREGNANCY TEST URINE, POC: NEGATIVE

## 2018-08-22 PROCEDURE — 73522 X-RAY EXAM HIPS BI 3-4 VIEWS: CPT

## 2018-08-22 PROCEDURE — 99283 EMERGENCY DEPT VISIT LOW MDM: CPT

## 2018-08-22 PROCEDURE — 1036F TOBACCO NON-USER: CPT | Performed by: NURSE PRACTITIONER

## 2018-08-22 PROCEDURE — 99213 OFFICE O/P EST LOW 20 MIN: CPT | Performed by: NURSE PRACTITIONER

## 2018-08-22 PROCEDURE — 2022F DILAT RTA XM EVC RTNOPTHY: CPT | Performed by: NURSE PRACTITIONER

## 2018-08-22 PROCEDURE — 3044F HG A1C LEVEL LT 7.0%: CPT | Performed by: NURSE PRACTITIONER

## 2018-08-22 PROCEDURE — G8417 CALC BMI ABV UP PARAM F/U: HCPCS | Performed by: NURSE PRACTITIONER

## 2018-08-22 PROCEDURE — G8427 DOCREV CUR MEDS BY ELIG CLIN: HCPCS | Performed by: NURSE PRACTITIONER

## 2018-08-22 PROCEDURE — 6370000000 HC RX 637 (ALT 250 FOR IP): Performed by: EMERGENCY MEDICINE

## 2018-08-22 PROCEDURE — 73523 X-RAY EXAM HIPS BI 5/> VIEWS: CPT

## 2018-08-22 RX ORDER — HYDROCODONE BITARTRATE AND ACETAMINOPHEN 5; 325 MG/1; MG/1
1 TABLET ORAL EVERY 6 HOURS PRN
Qty: 10 TABLET | Refills: 0 | Status: SHIPPED | OUTPATIENT
Start: 2018-08-22 | End: 2018-08-29

## 2018-08-22 RX ORDER — MELOXICAM 7.5 MG/1
7.5 TABLET ORAL DAILY
Qty: 30 TABLET | Refills: 0 | Status: SHIPPED | OUTPATIENT
Start: 2018-08-22 | End: 2019-01-09

## 2018-08-22 RX ORDER — HYDROCODONE BITARTRATE AND ACETAMINOPHEN 5; 325 MG/1; MG/1
1 TABLET ORAL ONCE
Status: COMPLETED | OUTPATIENT
Start: 2018-08-22 | End: 2018-08-22

## 2018-08-22 RX ADMIN — HYDROCODONE BITARTRATE AND ACETAMINOPHEN 1 TABLET: 5; 325 TABLET ORAL at 19:52

## 2018-08-22 ASSESSMENT — PAIN DESCRIPTION - PAIN TYPE: TYPE: ACUTE PAIN

## 2018-08-22 ASSESSMENT — PAIN SCALES - GENERAL: PAINLEVEL_OUTOF10: 10

## 2018-08-22 NOTE — PROGRESS NOTES
Medical Weight Management Progress Note    Subjective     Patient being seen for medically supervised weight loss for the chronic conditions of DM Type 2, Gallstones, Neurocardiogenic syncope, Anemia, ZAID, Arthritis, Chronic Pain Left Knee. She is a restart. She is working on the behavior changes discussed at the initial appointment. Patient continues on diet plan. Physical activity includes swimming, walking. Weight gain of 1 lb since last visit. Diabetes regimen includes metformin. Last A1C 6.5%. Waiting for CPAP. Psych eval completed and clearance received. No current issues. Working toward bariatric surgery:    [x] Sleeve Gastrectomy                                                           [] Lucas-en-Y Gastric Bypass    Allergies: Allergies   Allergen Reactions    Motrin [Ibuprofen]      Pt states that it causes her stomach to bleed    Shellfish-Derived Products Hives and Itching       Past Medical History:     Past Medical History:   Diagnosis Date    Anemia 9/18/2014    Chronic back pain     Diabetes mellitus type 2 in obese ([address]     GERD (gastroesophageal reflux disease)     Hip pain, chronic     Bilateral, bilat surgery as child    Hyperlipidemia 1/20/2015    [address]bid [address]     ZAID (obstructive sleep apnea)     Osteoarthritis     Syncope     Trichomonas contact, treated     Urinary incontinence    .     Past Surgical History:  Past Surgical History:   Procedure Laterality Date    HIP SURGERY      at age13 with pins/rods Bilateral       Family History:  Family History   Problem Relation Age of Onset    Hypertension Father     High Blood Pressure Father     Diabetes Mother     Hypertension Mother    [facility] Arthritis Mother     Depression Mother     High Blood Pressure Mother     High Cholesterol Mother     Diabetes Maternal Uncle     Diabetes Maternal Aunt     High Cholesterol Brother     Mental Illness Brother     Breast Cancer Neg Hx     Cancer Neg Hx     Colon Cancer Neg Hx     Eclampsia Neg Hx     Ovarian Cancer Neg Hx      Labor Neg Hx     Spont Abortions Neg Hx     Stroke Neg Hx        Social History:  Social History     Social History    Marital status:      Spouse name: N/A    Number of children: N/A    Years of education: N/A     Occupational History     N/A     Social History Main Topics    Smoking status: Former Smoker     Packs/day: 1.00     Years: 5.00     Types: Cigars     Quit date: 2013    Smokeless tobacco: Never Used    Alcohol use No    Drug use: No    Sexual activity: Yes     Partners: Male     Other Topics Concern    Not on file     Social History Narrative    No narrative on file       Current Medications:  Current Outpatient Prescriptions   Medication Sig Dispense Refill    vitamin D (ERGOCALCIFEROL) 90118 units CAPS capsule Take 1 capsule by mouth once a week for 8 doses 8 capsule 0    oxyCODONE-acetaminophen (PERCOCET) 5-325 MG per tablet Take 1 tablet by mouth every 4 hours as needed for Pain. Bedelia Sol metFORMIN (GLUCOPHAGE) 500 MG tablet Take 2 tablets by mouth 2 times daily (with meals) 120 tablet 5     No current facility-administered medications for this visit. Vital Signs:  /78 (Site: Right Arm, Position: Sitting, Cuff Size: Large Adult)   Pulse 74   Resp 22   Ht 5' 2.99\" (1.6 m)   Wt (!) 382 lb (173.3 kg)   BMI 67.69 kg/m²     BMI/Height/Weight:  Body mass index is 67.69 kg/m². Review of Systems - A review of systems was performed. All was negative unless otherwise documented in HPI. Constitutional: Negative for fever, chills and diaphoresis. HENT: Negative for hearing loss and trouble swallowing. Eyes: Negative for photophobia and visual disturbance. Respiratory: Negative for cough, shortness of breath and wheezing. Cardiovascular: Negative for chest pain and palpitations.    Gastrointestinal: Negative for nausea, vomiting, abdominal pain, diarrhea, constipation, blood in stool and abdominal distention. Endocrine: Negative for polydipsia, polyphagia and polyuria. Genitourinary: Negative for dysuria, frequency, hematuria and difficulty urinating. Musculoskeletal: Negative for myalgias, joint swelling. Skin: Negative for pallor and rash. Neurological: Negative for dizziness, tremors, light-headedness and headaches. Psychiatric/Behavioral: Negative for sleep disturbance and dysphoric mood. Objective:      Physical Exam   Vital signs reviewed. General: Well-developed and well-nourished. No acute distress. Skin: Warm, dry and intact. HEENT: Normocephalic. EOMs intact. Conjunctivae normal. Neck supple. Cardiovascular: Normal rate, regular rhythm. No murmur. Pulmonary/Chest: Normal effort. Lungs clear to auscultation. No rales, rhonchi or wheezing. Abdominal: Positive bowel sounds. Soft, nontender. Nondistended. Musculoskeletal: Movement x4. No edema. Neurological: Gait normal. Alert and oriented to person, place, and time. Psychiatric: Normal mood and affect. Speech and behavior normal. Judgment and thought content normal. Cognition and memory intact. Assessment:       Diagnosis Orders   1. ZAID (obstructive sleep apnea)     2. Chronic pain of left knee     3. Neurocardiogenic syncope     4. Calculus of gallbladder without cholecystitis without obstruction     5. Arthralgia of hip, unspecified laterality     6. Diabetes mellitus type 2 in obese ([address]     7. Iron deficiency anemia, unspecified iron deficiency anemia type     8. Morbid obesity with BMI of 60.0-69.9, adult [facility])         Plan:    Dietitian visit today. Patient was encouraged to journal all food intake. Keep calorie level at approximately 9061-7308. Protein intake is to be a minimum of 60-80 grams per day. Water drinking was encouraged with a goal of 64oz-128oz daily. Beverages to be calorie free except for milk. Every other beverage should be water. Avoid soda.    Continue to increase level of

## 2018-08-22 NOTE — ED PROVIDER NOTES
[facility]  Emergency Department  Faculty Attestation     I performed a history and physical examination of the patient and discussed management with the resident. I reviewed the residents note and agree with the documented findings and plan of care. Any areas of disagreement are noted on the chart. I was personally present for the key portions of any procedures. I have documented in the chart those procedures where I was not present during the key portions. I have reviewed the emergency nurses triage note. I agree with the chief complaint, past medical history, past surgical history, allergies, medications, social and family history as documented unless otherwise noted below. For Physician Assistant/ Nurse Practitioner cases/documentation I have personally evaluated this patient and have completed at least one if not all key elements of the E/M (history, physical exam, and MDM). Additional findings are as noted. Primary Care Physician:  Evita Medley MD    Screenings:  [address]       Chief Complaint   Patient presents with    Hip Pain     Bilat hip pain worse over the last few weeks. Worse today. Hx pins placed at age 17. RECENT VITALS:    ,  Pulse: 74, Resp: 16, BP: 120/60    LABS:  Labs Reviewed   POCT URINE PREGNANCY       Radiology  XR HIP 3-4 VW W PELVIS BILATERAL    (Results Pending)         Attending Physician Additional  Notes    Patient's been having bilateral progressive worsening hip pain for years. The last films weren't 2014. She had slipped capital femoral epiphysis age 8. She has clicking and crepitus with walking. She is unable to go up and down steps. She is having difficulty ambulating. Pain is persisted despite analgesics and topical medications. She is scheduled to see orthopedic surgery next month for opinion regarding hip replacement.   On exam she is morbidly obese uncomfortable vital signs are normal, pain
more for level 5)      INITIAL VITALS:   /60   Pulse 74   Resp 16   Ht 5' 2\" (1.575 m)   Wt (!) 374 lb (169.6 kg)   LMP 08/20/2018   SpO2 99%   BMI 68.41 kg/m²     Physical Exam   Constitutional: She is oriented to person, place, and time. She appears well-developed and well-nourished. No distress. HENT:   Head: Normocephalic and atraumatic. Cardiovascular: Normal rate and regular rhythm. Pulmonary/Chest: Effort normal. No respiratory distress. Musculoskeletal: She exhibits tenderness. She exhibits no edema or deformity. Right hip: She exhibits decreased range of motion, decreased strength and tenderness. She exhibits no swelling, no crepitus and no deformity. Left hip: She exhibits decreased range of motion, decreased strength, tenderness and bony tenderness. Decreased range of motion to hip flexion, abduction, internal rotation bilaterally  Tenderness over the groin  No erythema, warmth or induration  No numbness or tingling down her extremities or swelling  Note: Exam is extremely limited due to patient's body habitus   Neurological: She is alert and oriented to person, place, and time. Skin: Skin is warm and dry. No rash noted. She is not diaphoretic. No erythema. Psychiatric: She has a normal mood and affect. Her behavior is normal.   Nursing note and vitals reviewed. DIFFERENTIAL  DIAGNOSIS     PLAN (LABS / IMAGING / EKG):  Orders Placed This Encounter   Procedures    XR HIP 3-4 VW W PELVIS BILATERAL    Inpatient consult to Orthopedic Surgery    POCT urine pregnancy       MEDICATIONS ORDERED:  Orders Placed This Encounter   Medications    HYDROcodone-acetaminophen (NORCO) 5-325 MG per tablet 1 tablet    HYDROcodone-acetaminophen (NORCO) 5-325 MG per tablet     Sig: Take 1 tablet by mouth every 6 hours as needed for Pain for up to 7 days. .     Dispense:  10 tablet     Refill:  0    meloxicam (MOBIC) 7.5 MG tablet     Sig: Take 1 tablet by mouth daily

## 2018-08-23 ASSESSMENT — ENCOUNTER SYMPTOMS: BACK PAIN: 0

## 2018-08-29 ENCOUNTER — OFFICE VISIT (OUTPATIENT)
Dept: ORTHOPEDIC SURGERY | Age: 43
End: 2018-08-29
Payer: MEDICARE

## 2018-08-29 VITALS
WEIGHT: 293 LBS | BODY MASS INDEX: 51.91 KG/M2 | DIASTOLIC BLOOD PRESSURE: 74 MMHG | SYSTOLIC BLOOD PRESSURE: 116 MMHG | HEART RATE: 83 BPM | HEIGHT: 63 IN

## 2018-08-29 DIAGNOSIS — M16.0 BILATERAL PRIMARY OSTEOARTHRITIS OF HIP: Primary | ICD-10-CM

## 2018-08-29 PROCEDURE — 99213 OFFICE O/P EST LOW 20 MIN: CPT | Performed by: STUDENT IN AN ORGANIZED HEALTH CARE EDUCATION/TRAINING PROGRAM

## 2018-08-29 PROCEDURE — G8427 DOCREV CUR MEDS BY ELIG CLIN: HCPCS | Performed by: STUDENT IN AN ORGANIZED HEALTH CARE EDUCATION/TRAINING PROGRAM

## 2018-08-29 PROCEDURE — G8417 CALC BMI ABV UP PARAM F/U: HCPCS | Performed by: STUDENT IN AN ORGANIZED HEALTH CARE EDUCATION/TRAINING PROGRAM

## 2018-08-29 PROCEDURE — 1036F TOBACCO NON-USER: CPT | Performed by: STUDENT IN AN ORGANIZED HEALTH CARE EDUCATION/TRAINING PROGRAM

## 2018-09-06 LAB — STATUS: NORMAL

## 2018-09-12 ENCOUNTER — OFFICE VISIT (OUTPATIENT)
Dept: BARIATRICS/WEIGHT MGMT | Age: 43
End: 2018-09-12
Payer: MEDICARE

## 2018-09-12 ENCOUNTER — NURSE ONLY (OUTPATIENT)
Dept: BARIATRICS/WEIGHT MGMT | Age: 43
End: 2018-09-12

## 2018-09-12 VITALS
SYSTOLIC BLOOD PRESSURE: 120 MMHG | DIASTOLIC BLOOD PRESSURE: 72 MMHG | BODY MASS INDEX: 51.91 KG/M2 | HEIGHT: 63 IN | RESPIRATION RATE: 22 BRPM | WEIGHT: 293 LBS | HEART RATE: 68 BPM

## 2018-09-12 DIAGNOSIS — D50.9 IRON DEFICIENCY ANEMIA, UNSPECIFIED IRON DEFICIENCY ANEMIA TYPE: ICD-10-CM

## 2018-09-12 DIAGNOSIS — K80.20 CALCULUS OF GALLBLADDER WITHOUT CHOLECYSTITIS WITHOUT OBSTRUCTION: ICD-10-CM

## 2018-09-12 DIAGNOSIS — M25.551 PAIN OF BOTH HIP JOINTS: ICD-10-CM

## 2018-09-12 DIAGNOSIS — E66.01 MORBID OBESITY WITH BMI OF 60.0-69.9, ADULT (HCC): ICD-10-CM

## 2018-09-12 DIAGNOSIS — G47.33 OSA (OBSTRUCTIVE SLEEP APNEA): ICD-10-CM

## 2018-09-12 DIAGNOSIS — M25.552 PAIN OF BOTH HIP JOINTS: ICD-10-CM

## 2018-09-12 DIAGNOSIS — E11.69 DIABETES MELLITUS TYPE 2 IN OBESE (HCC): Primary | ICD-10-CM

## 2018-09-12 DIAGNOSIS — E66.9 DIABETES MELLITUS TYPE 2 IN OBESE (HCC): Primary | ICD-10-CM

## 2018-09-12 DIAGNOSIS — R55 NEUROCARDIOGENIC SYNCOPE: ICD-10-CM

## 2018-09-12 PROCEDURE — 1036F TOBACCO NON-USER: CPT | Performed by: NURSE PRACTITIONER

## 2018-09-12 PROCEDURE — G8417 CALC BMI ABV UP PARAM F/U: HCPCS | Performed by: NURSE PRACTITIONER

## 2018-09-12 PROCEDURE — G8427 DOCREV CUR MEDS BY ELIG CLIN: HCPCS | Performed by: NURSE PRACTITIONER

## 2018-09-12 PROCEDURE — 99213 OFFICE O/P EST LOW 20 MIN: CPT | Performed by: NURSE PRACTITIONER

## 2018-09-12 PROCEDURE — 3044F HG A1C LEVEL LT 7.0%: CPT | Performed by: NURSE PRACTITIONER

## 2018-09-12 PROCEDURE — 2022F DILAT RTA XM EVC RTNOPTHY: CPT | Performed by: NURSE PRACTITIONER

## 2018-09-12 NOTE — PROGRESS NOTES
Medical Weight Management Progress Note    Subjective     Patient being seen for medically supervised weight loss for the chronic conditions of DM Type 2, Gallstones, Neurocardiogenic syncope, Anemia, ZAID, Arthritis, Chronic Pain Left Knee. She is a restart. She is working on the behavior changes discussed at the initial appointment. Patient continues on diet plan. Physical activity includes walking. Weight loss of 3 since last visit. Diabetes regimen includes metformin. Last A1C 6.5%. Waiting for CPAP. Psych eval completed and clearance received. No current issues. Working toward bariatric surgery:    [x] Sleeve Gastrectomy, possible Cholecystectomy                                                           [] Lucas-en-Y Gastric Bypass    Allergies: Allergies   Allergen Reactions    Motrin [Ibuprofen]      Pt states that it causes her stomach to bleed    Shellfish-Derived Products Hives and Itching       Past Medical History:     Past Medical History:   Diagnosis Date    Anemia 9/18/2014    Chronic back pain     Diabetes mellitus type 2 in obese ([address]     GERD (gastroesophageal reflux disease)     Hip pain, chronic     Bilateral, bilat surgery as child    Hyperlipidemia 1/20/2015    Morbid [facility] [address]     ZAID (obstructive sleep apnea)     Osteoarthritis     Syncope     Trichomonas contact, treated     Urinary incontinence    .     Past Surgical History:  Past Surgical History:   Procedure Laterality Date    HIP SURGERY      at age15 with pins/rods Bilateral       Family History:  Family History   Problem Relation Age of Onset    Hypertension Father     High Blood Pressure Father     Diabetes Mother     Hypertension Mother    [employer] Arthritis Mother     Depression Mother     High Blood Pressure Mother     High Cholesterol Mother     Diabetes Maternal Uncle     Diabetes Maternal Aunt     High Cholesterol Brother     Mental Illness Brother     Breast Cancer Neg Hx     Cancer Neg Hx     Colon Cancer Neg Hx     Eclampsia Neg Hx     Ovarian Cancer Neg Hx      Labor Neg Hx     Spont Abortions Neg Hx     Stroke Neg Hx        Social History:  Social History     Social History    Marital status:      Spouse name: N/A    Number of children: N/A    Years of education: N/A     Occupational History     N/A     Social History Main Topics    Smoking status: Former Smoker     Packs/day: 1.00     Years: 5.00     Types: Cigars     Quit date: 2013    Smokeless tobacco: Never Used    Alcohol use No    Drug use: No    Sexual activity: Yes     Partners: Male     Other Topics Concern    Not on file     Social History Narrative    No narrative on file       Current Medications:  Current Outpatient Prescriptions   Medication Sig Dispense Refill    meloxicam (MOBIC) 7.5 MG tablet Take 1 tablet by mouth daily 30 tablet 0    oxyCODONE-acetaminophen (PERCOCET) 5-325 MG per tablet Take 1 tablet by mouth every 4 hours as needed for Pain. Jaqui Regdeondre metFORMIN (GLUCOPHAGE) 500 MG tablet Take 2 tablets by mouth 2 times daily (with meals) 120 tablet 5    vitamin D (ERGOCALCIFEROL) 97515 units CAPS capsule Take 1 capsule by mouth once a week for 8 doses 8 capsule 0     No current facility-administered medications for this visit. Vital Signs:  /72 (Site: Right Upper Arm, Position: Sitting, Cuff Size: Large Adult)   Pulse 68   Resp 22   Ht 5' 2.99\" (1.6 m)   Wt (!) 379 lb (171.9 kg)   LMP 2018   BMI 67.15 kg/m²     BMI/Height/Weight:  Body mass index is 67.15 kg/m². Review of Systems - A review of systems was performed. All was negative unless otherwise documented in HPI. Constitutional: Negative for fever, chills and diaphoresis. HENT: Negative for hearing loss and trouble swallowing. Eyes: Negative for photophobia and visual disturbance. Respiratory: Negative for cough, shortness of breath and wheezing.    Cardiovascular: Negative for chest pain and palpitations. Gastrointestinal: Negative for nausea, vomiting, abdominal pain, diarrhea, constipation, blood in stool and abdominal distention. Endocrine: Negative for polydipsia, polyphagia and polyuria. Genitourinary: Negative for dysuria, frequency, hematuria and difficulty urinating. Musculoskeletal: Negative for myalgias, joint swelling. Skin: Negative for pallor and rash. Neurological: Negative for dizziness, tremors, light-headedness and headaches. Psychiatric/Behavioral: Negative for sleep disturbance and dysphoric mood. Objective:      Physical Exam   Vital signs reviewed. General: Well-developed and well-nourished. No acute distress. Skin: Warm, dry and intact. HEENT: Normocephalic. EOMs intact. Conjunctivae normal. Neck supple. Cardiovascular: Normal rate, regular rhythm. No murmur. Pulmonary/Chest: Normal effort. Lungs clear to auscultation. No rales, rhonchi or wheezing. Abdominal: Positive bowel sounds. Soft, nontender. Nondistended. Musculoskeletal: Movement x4. No edema. Neurological: Gait normal. Alert and oriented to person, place, and time. Psychiatric: Normal mood and affect. Speech and behavior normal. Judgment and thought content normal. Cognition and memory intact. Assessment:       Diagnosis Orders   1. Diabetes mellitus type 2 in obese ([address]     2. ZAID (obstructive sleep apnea)     3. Neurocardiogenic syncope     4. Iron deficiency anemia, unspecified iron deficiency anemia type     5. Pain of both hip joints     6. Calculus of gallbladder without cholecystitis without obstruction     7. Morbid obesity with BMI of 60.0-69.9, adult [facility])         Plan:    Dietitian visit today. Patient was encouraged to journal all food intake. Keep calorie level at approximately 4714-0081. Protein intake is to be a minimum of 60-80 grams per day. Water drinking was encouraged with a goal of 64oz-128oz daily. Beverages to be calorie free except for milk.  Every other

## 2018-09-13 ENCOUNTER — TELEPHONE (OUTPATIENT)
Dept: INTERNAL MEDICINE | Age: 43
End: 2018-09-13

## 2018-09-14 RX ORDER — PERMETHRIN 50 MG/G
CREAM TOPICAL
Qty: 1 TUBE | Refills: 1 | Status: SHIPPED | OUTPATIENT
Start: 2018-09-14 | End: 2019-01-09

## 2018-09-24 ENCOUNTER — TELEPHONE (OUTPATIENT)
Dept: BARIATRICS/WEIGHT MGMT | Age: 43
End: 2018-09-24

## 2018-09-24 NOTE — TELEPHONE ENCOUNTER
Authorization from [place] adv received to schedule surgery. Left pt msg to call office back to schedule.

## 2018-10-01 ENCOUNTER — TELEPHONE (OUTPATIENT)
Dept: BARIATRICS/WEIGHT MGMT | Age: 43
End: 2018-10-01

## 2018-10-08 ENCOUNTER — NURSE ONLY (OUTPATIENT)
Dept: BARIATRICS/WEIGHT MGMT | Age: 43
End: 2018-10-08

## 2018-10-12 ENCOUNTER — OFFICE VISIT (OUTPATIENT)
Dept: BARIATRICS/WEIGHT MGMT | Age: 43
End: 2018-10-12

## 2018-10-12 VITALS
RESPIRATION RATE: 20 BRPM | HEART RATE: 72 BPM | DIASTOLIC BLOOD PRESSURE: 80 MMHG | WEIGHT: 293 LBS | BODY MASS INDEX: 51.91 KG/M2 | HEIGHT: 63 IN | SYSTOLIC BLOOD PRESSURE: 116 MMHG

## 2018-10-12 DIAGNOSIS — E66.01 MORBID OBESITY (HCC): ICD-10-CM

## 2018-10-12 DIAGNOSIS — K21.9 GASTROESOPHAGEAL REFLUX DISEASE WITHOUT ESOPHAGITIS: ICD-10-CM

## 2018-10-12 DIAGNOSIS — G47.33 OBSTRUCTIVE SLEEP APNEA: Primary | ICD-10-CM

## 2018-10-12 PROCEDURE — 99213 OFFICE O/P EST LOW 20 MIN: CPT | Performed by: SURGERY

## 2018-10-14 NOTE — PROGRESS NOTES
[address]  [place] [address]  Dept: [phone]    SURGICAL WEIGHT MANAGEMENT PROGRAM   PROGRESS NOTE -  SURGICAL EVALUATION     Patient: Martha Avila        Service Date: 10/12/2018       Medical Record #: [id number]    Patient History/Assessment Summary:    CC: Weight loss    The patient is a pleasant 41y.o. year old female  with morbid obesity, who stands Height: 5' 3\" (160 cm) tall with a weight of Weight: (!) 375 lb (170.1 kg) , resulting in a BMI of Body mass index is 66.43 kg/m². .     This patient is alone for the evaluation today. The patient is being evaluated to undergo weight loss surgery to treat the following comorbid conditions caused by her morbid obesity: Obstructive Sleep Apnea and GERD    She attended the weight loss surgery seminar, and attended bariatric education    Last Visit Weight:   Wt Readings from Last 3 Encounters:   10/12/18 (!) 375 lb (170.1 kg)   09/12/18 (!) 379 lb (171.9 kg)   08/29/18 (!) 377 lb (171 kg)     Today's weight is decreased from the last visit. Highest  Weight: 379    The patient is being evaluated for Laparoscopic Sleeve Gastrectomy and Laparoscopic Liver Biopsy. She  is here today to review the details of surgery    The patient acknowledges and understands the risks, benefits, and options we have discussed, as outlined in the Additional Informed Consent for this procedure. Patient also understands the importance of surgical and post-operative recommendations, including the operative diet and regular post-operative follow up care. The importance of ambulation and incentive spirometry was also discussed. All questions of this patient and any family members present have been answered to their satisfaction.     Physical Examination:     /80 (Site: Left Upper Arm, Position: Sitting, Cuff Size: Large Adult)   Pulse 72   Resp 20   Ht 5' 3\" (1.6 m)   Wt (!) 375 lb (170.1 kg)   LMP 09/18/2018 (Exact Date)   BMI 66.43 kg/m²   General This patient is awake, alert, and oriented, and is in no apparent distress. Cardiac Regular rate and rhythm without evidence of murmur. Respiratory Clear to auscultation bilaterally. Abdomen Obese, soft, non-tender, non-distended without masses/ No  evidence of abdominal hernia / Incisions consistent with previous surgeries. Head and Neck Obese, normocephalic and atraumatic/soft and supple, no  lymphadenopathy or obvious bruits. Extremeties No cyanosis, clubbing or edema/ No calf tenderness/No  restrictions of movement, is ambulatory without assistance. Neurological Intact x 4 extremities, no focal deficits noted   Skin No rashes or lesions noted   Rectal Deferred     RECOMMENDATIONS:       Diagnosis Orders   1. Obstructive sleep apnea     2. Gastroesophageal reflux disease without esophagitis     3. Morbid obesity ([address]            We spent a great deal of time discussing the risks and benefits of Laparoscopic Sleeve Gastrectomy and Laparoscopic Liver Biopsy, including but not limited to injury to intra-abdominal organs, breakdown of the gastric staple line, the need for re-operative therapy,  prolonged hospitalization,  mechanical ventilation,  and death. We discussed the possibility of bleeding, the need for blood transfusions, blood clots, hospital-acquired and intra-abdominal infection, anastomotic stricture, and worsening GERD. And we discussed the need for post-operative visit compliance, behavior modifications and diet changes, protein and vitamin supplementation, as well as routine scheduled and dedicated exercise. We discussed the potential weight loss benefit of approximately % of her excess body weight at 12-18 months post-op, as well as the possibility of insufficient weight loss or weight gain after 2 years post-operative time.      The following was discussed with the patient:    DVT Prophylaxis    Risk of GERD    Risk of

## 2018-10-18 ENCOUNTER — TELEPHONE (OUTPATIENT)
Dept: BARIATRICS/WEIGHT MGMT | Age: 43
End: 2018-10-18

## 2018-11-02 ENCOUNTER — TELEPHONE (OUTPATIENT)
Dept: BARIATRICS/WEIGHT MGMT | Age: 43
End: 2018-11-02

## 2018-11-15 ENCOUNTER — TELEPHONE (OUTPATIENT)
Dept: BARIATRICS/WEIGHT MGMT | Age: 43
End: 2018-11-15

## 2019-01-07 ENCOUNTER — NURSE ONLY (OUTPATIENT)
Dept: BARIATRICS/WEIGHT MGMT | Age: 44
End: 2019-01-07

## 2019-01-09 ENCOUNTER — HOSPITAL ENCOUNTER (OUTPATIENT)
Age: 44
Discharge: HOME | End: 2019-01-09
Payer: MEDICARE

## 2019-01-09 ENCOUNTER — HOSPITAL ENCOUNTER (OUTPATIENT)
Dept: PREADMISSION TESTING | Age: 44
Discharge: HOME | End: 2019-01-13
Payer: MEDICARE

## 2019-01-09 ENCOUNTER — OFFICE VISIT (OUTPATIENT)
Dept: PRIMARY CARE CLINIC | Age: 44
End: 2019-01-09
Payer: MEDICARE

## 2019-01-09 ENCOUNTER — HOSPITAL ENCOUNTER (OUTPATIENT)
Dept: GENERAL RADIOLOGY | Age: 44
Discharge: HOME | End: 2019-01-11
Payer: MEDICARE

## 2019-01-09 VITALS
RESPIRATION RATE: 18 BRPM | SYSTOLIC BLOOD PRESSURE: 141 MMHG | OXYGEN SATURATION: 99 % | BODY MASS INDEX: 51.91 KG/M2 | HEART RATE: 74 BPM | WEIGHT: 293 LBS | DIASTOLIC BLOOD PRESSURE: 93 MMHG | TEMPERATURE: 97.9 F | HEIGHT: 63 IN

## 2019-01-09 VITALS
DIASTOLIC BLOOD PRESSURE: 95 MMHG | BODY MASS INDEX: 65.9 KG/M2 | SYSTOLIC BLOOD PRESSURE: 149 MMHG | OXYGEN SATURATION: 98 % | TEMPERATURE: 98.4 F | HEART RATE: 81 BPM | WEIGHT: 293 LBS

## 2019-01-09 DIAGNOSIS — Z23 NEED FOR PROPHYLACTIC VACCINATION AGAINST DIPHTHERIA-TETANUS-PERTUSSIS (DTP): ICD-10-CM

## 2019-01-09 DIAGNOSIS — Z01.818 PREOP EXAMINATION: ICD-10-CM

## 2019-01-09 DIAGNOSIS — Z01.818 PREOP EXAMINATION: Primary | ICD-10-CM

## 2019-01-09 DIAGNOSIS — Z23 NEED FOR PROPHYLACTIC VACCINATION AGAINST STREPTOCOCCUS PNEUMONIAE (PNEUMOCOCCUS): ICD-10-CM

## 2019-01-09 LAB
ALP BLD-CCNC: 86 U/L (ref 35–104)
ALT SERPL-CCNC: 24 U/L (ref 5–33)
ANION GAP SERPL CALCULATED.3IONS-SCNC: 20 MMOL/L (ref 9–17)
BUN BLDV-MCNC: 11 MG/DL (ref 6–20)
BUN/CREAT BLD: ABNORMAL (ref 9–20)
CALCIUM SERPL-MCNC: 10.4 MG/DL (ref 8.6–10.4)
CHLORIDE BLD-SCNC: 102 MMOL/L (ref 98–107)
CO2: 22 MMOL/L (ref 20–31)
CREAT SERPL-MCNC: 0.72 MG/DL (ref 0.5–0.9)
CREATININE URINE: 131.8 MG/DL (ref 28–217)
EKG ATRIAL RATE: 70 BPM
EKG P AXIS: 69 DEGREES
EKG P-R INTERVAL: 156 MS
EKG Q-T INTERVAL: 360 MS
EKG QRS DURATION: 88 MS
EKG QTC CALCULATION (BAZETT): 388 MS
EKG R AXIS: 18 DEGREES
EKG T AXIS: 18 DEGREES
EKG VENTRICULAR RATE: 70 BPM
GFR AFRICAN AMERICAN: >60 ML/MIN
GFR NON-AFRICAN AMERICAN: >60 ML/MIN
GFR SERPL CREATININE-BSD FRML MDRD: ABNORMAL ML/MIN/{1.73_M2}
GFR SERPL CREATININE-BSD FRML MDRD: ABNORMAL ML/MIN/{1.73_M2}
GLUCOSE BLD-MCNC: 89 MG/DL (ref 70–99)
HCT VFR BLD CALC: 43.1 % (ref 36.3–47.1)
HEMOGLOBIN: 13.6 G/DL (ref 11.9–15.1)
INR BLD: 0.9
MCH RBC QN AUTO: 27.9 PG (ref 25.2–33.5)
MCHC RBC AUTO-ENTMCNC: 31.6 G/DL (ref 28.4–34.8)
MCV RBC AUTO: 88.5 FL (ref 82.6–102.9)
MICROALBUMIN/CREAT 24H UR: <12 MG/L
MICROALBUMIN/CREAT UR-RTO: NORMAL MCG/MG CREAT
NRBC AUTOMATED: 0 PER 100 WBC
PARTIAL THROMBOPLASTIN TIME: 26.6 SEC (ref 20.5–30.5)
PDW BLD-RTO: 13.5 % (ref 11.8–14.4)
PLATELET # BLD: 517 K/UL (ref 138–453)
PMV BLD AUTO: 9.6 FL (ref 8.1–13.5)
POTASSIUM SERPL-SCNC: 3.5 MMOL/L (ref 3.7–5.3)
PROTHROMBIN TIME: 10.1 SEC (ref 9–12)
RBC # BLD: 4.87 M/UL (ref 3.95–5.11)
SODIUM BLD-SCNC: 144 MMOL/L (ref 135–144)
WBC # BLD: 8 K/UL (ref 3.5–11.3)

## 2019-01-09 PROCEDURE — 85027 COMPLETE CBC AUTOMATED: CPT

## 2019-01-09 PROCEDURE — G8417 CALC BMI ABV UP PARAM F/U: HCPCS | Performed by: NURSE PRACTITIONER

## 2019-01-09 PROCEDURE — 93005 ELECTROCARDIOGRAM TRACING: CPT

## 2019-01-09 PROCEDURE — 99214 OFFICE O/P EST MOD 30 MIN: CPT | Performed by: NURSE PRACTITIONER

## 2019-01-09 PROCEDURE — 84460 ALANINE AMINO (ALT) (SGPT): CPT

## 2019-01-09 PROCEDURE — 90715 TDAP VACCINE 7 YRS/> IM: CPT | Performed by: NURSE PRACTITIONER

## 2019-01-09 PROCEDURE — 71046 X-RAY EXAM CHEST 2 VIEWS: CPT

## 2019-01-09 PROCEDURE — 1036F TOBACCO NON-USER: CPT | Performed by: NURSE PRACTITIONER

## 2019-01-09 PROCEDURE — 36415 COLL VENOUS BLD VENIPUNCTURE: CPT

## 2019-01-09 PROCEDURE — 82570 ASSAY OF URINE CREATININE: CPT

## 2019-01-09 PROCEDURE — 80048 BASIC METABOLIC PNL TOTAL CA: CPT

## 2019-01-09 PROCEDURE — G8484 FLU IMMUNIZE NO ADMIN: HCPCS | Performed by: NURSE PRACTITIONER

## 2019-01-09 PROCEDURE — 85730 THROMBOPLASTIN TIME PARTIAL: CPT

## 2019-01-09 PROCEDURE — 84075 ASSAY ALKALINE PHOSPHATASE: CPT

## 2019-01-09 PROCEDURE — 90472 IMMUNIZATION ADMIN EACH ADD: CPT | Performed by: NURSE PRACTITIONER

## 2019-01-09 PROCEDURE — G8427 DOCREV CUR MEDS BY ELIG CLIN: HCPCS | Performed by: NURSE PRACTITIONER

## 2019-01-09 PROCEDURE — G0480 DRUG TEST DEF 1-7 CLASSES: HCPCS

## 2019-01-09 PROCEDURE — 90732 PPSV23 VACC 2 YRS+ SUBQ/IM: CPT | Performed by: NURSE PRACTITIONER

## 2019-01-09 PROCEDURE — 90471 IMMUNIZATION ADMIN: CPT | Performed by: NURSE PRACTITIONER

## 2019-01-09 PROCEDURE — 85610 PROTHROMBIN TIME: CPT

## 2019-01-09 PROCEDURE — 82043 UR ALBUMIN QUANTITATIVE: CPT

## 2019-01-09 RX ORDER — CALCIUM CARBONATE 200(500)MG
2 TABLET,CHEWABLE ORAL DAILY
Status: ON HOLD | COMMUNITY
End: 2019-01-22 | Stop reason: HOSPADM

## 2019-01-09 RX ORDER — M-VIT,TX,IRON,MINS/CALC/FOLIC 27MG-0.4MG
1 TABLET ORAL 2 TIMES DAILY
Status: ON HOLD | COMMUNITY
End: 2019-01-22 | Stop reason: HOSPADM

## 2019-01-09 RX ORDER — SODIUM CHLORIDE, SODIUM LACTATE, POTASSIUM CHLORIDE, CALCIUM CHLORIDE 600; 310; 30; 20 MG/100ML; MG/100ML; MG/100ML; MG/100ML
1000 INJECTION, SOLUTION INTRAVENOUS CONTINUOUS
Status: CANCELLED | OUTPATIENT
Start: 2019-01-09

## 2019-01-09 ASSESSMENT — ENCOUNTER SYMPTOMS
SHORTNESS OF BREATH: 0
WHEEZING: 0
DIARRHEA: 0
TROUBLE SWALLOWING: 0
ABDOMINAL PAIN: 0
BLOOD IN STOOL: 0
VOMITING: 0

## 2019-01-16 LAB
3-OH-COTININE: <2 NG/ML
COTININE: <2 NG/ML
NICOTINE: <2 NG/ML

## 2019-01-18 ENCOUNTER — OFFICE VISIT (OUTPATIENT)
Dept: BARIATRICS/WEIGHT MGMT | Age: 44
End: 2019-01-18
Payer: MEDICARE

## 2019-01-18 VITALS
BODY MASS INDEX: 51.91 KG/M2 | HEART RATE: 80 BPM | DIASTOLIC BLOOD PRESSURE: 72 MMHG | HEIGHT: 63 IN | SYSTOLIC BLOOD PRESSURE: 128 MMHG | WEIGHT: 293 LBS

## 2019-01-18 DIAGNOSIS — G47.33 OBSTRUCTIVE SLEEP APNEA: Primary | ICD-10-CM

## 2019-01-18 DIAGNOSIS — E66.01 MORBID OBESITY (HCC): ICD-10-CM

## 2019-01-18 PROCEDURE — G8427 DOCREV CUR MEDS BY ELIG CLIN: HCPCS | Performed by: SURGERY

## 2019-01-18 PROCEDURE — G8484 FLU IMMUNIZE NO ADMIN: HCPCS | Performed by: SURGERY

## 2019-01-18 PROCEDURE — 1036F TOBACCO NON-USER: CPT | Performed by: SURGERY

## 2019-01-18 PROCEDURE — 99213 OFFICE O/P EST LOW 20 MIN: CPT | Performed by: SURGERY

## 2019-01-18 PROCEDURE — G8417 CALC BMI ABV UP PARAM F/U: HCPCS | Performed by: SURGERY

## 2019-01-21 ENCOUNTER — HOSPITAL ENCOUNTER (INPATIENT)
Age: 44
LOS: 1 days | Discharge: HOME | DRG: 403 | End: 2019-01-22
Attending: SURGERY | Admitting: SURGERY
Payer: MEDICARE

## 2019-01-21 ENCOUNTER — ANESTHESIA (OUTPATIENT)
Dept: OPERATING ROOM | Age: 44
DRG: 403 | End: 2019-01-21
Payer: MEDICARE

## 2019-01-21 ENCOUNTER — ANESTHESIA EVENT (OUTPATIENT)
Dept: OPERATING ROOM | Age: 44
DRG: 403 | End: 2019-01-21
Payer: MEDICARE

## 2019-01-21 VITALS — OXYGEN SATURATION: 98 % | DIASTOLIC BLOOD PRESSURE: 76 MMHG | SYSTOLIC BLOOD PRESSURE: 137 MMHG | TEMPERATURE: 97 F

## 2019-01-21 DIAGNOSIS — Z98.84 STATUS POST LAPAROSCOPIC SLEEVE GASTRECTOMY: Primary | ICD-10-CM

## 2019-01-21 LAB
ANION GAP SERPL CALCULATED.3IONS-SCNC: 18 MMOL/L (ref 9–17)
BUN BLDV-MCNC: 8 MG/DL (ref 6–20)
BUN/CREAT BLD: ABNORMAL (ref 9–20)
CALCIUM SERPL-MCNC: 9 MG/DL (ref 8.6–10.4)
CHLORIDE BLD-SCNC: 100 MMOL/L (ref 98–107)
CO2: 23 MMOL/L (ref 20–31)
CREAT SERPL-MCNC: 0.81 MG/DL (ref 0.5–0.9)
GFR AFRICAN AMERICAN: >60 ML/MIN
GFR NON-AFRICAN AMERICAN: >60 ML/MIN
GFR SERPL CREATININE-BSD FRML MDRD: ABNORMAL ML/MIN/{1.73_M2}
GFR SERPL CREATININE-BSD FRML MDRD: ABNORMAL ML/MIN/{1.73_M2}
GLUCOSE BLD-MCNC: 110 MG/DL (ref 65–105)
GLUCOSE BLD-MCNC: 129 MG/DL (ref 65–105)
GLUCOSE BLD-MCNC: 161 MG/DL (ref 70–99)
GLUCOSE BLD-MCNC: 90 MG/DL (ref 65–105)
HCT VFR BLD CALC: 41.4 % (ref 36.3–47.1)
HEMOGLOBIN: 13 G/DL (ref 11.9–15.1)
MCH RBC QN AUTO: 28.2 PG (ref 25.2–33.5)
MCHC RBC AUTO-ENTMCNC: 31.4 G/DL (ref 28.4–34.8)
MCV RBC AUTO: 89.8 FL (ref 82.6–102.9)
NRBC AUTOMATED: 0 PER 100 WBC
PDW BLD-RTO: 13.3 % (ref 11.8–14.4)
PLATELET # BLD: 459 K/UL (ref 138–453)
PMV BLD AUTO: 9.6 FL (ref 8.1–13.5)
POTASSIUM SERPL-SCNC: 3.5 MMOL/L (ref 3.7–5.3)
RBC # BLD: 4.61 M/UL (ref 3.95–5.11)
SODIUM BLD-SCNC: 141 MMOL/L (ref 135–144)
WBC # BLD: 9.3 K/UL (ref 3.5–11.3)

## 2019-01-21 PROCEDURE — 2580000003 HC RX 258: Performed by: ANESTHESIOLOGY

## 2019-01-21 PROCEDURE — 2500000003 HC RX 250 WO HCPCS: Performed by: SURGERY

## 2019-01-21 PROCEDURE — 82947 ASSAY GLUCOSE BLOOD QUANT: CPT

## 2019-01-21 PROCEDURE — 6360000002 HC RX W HCPCS: Performed by: ANESTHESIOLOGY

## 2019-01-21 PROCEDURE — G0378 HOSPITAL OBSERVATION PER HR: HCPCS

## 2019-01-21 PROCEDURE — 1200000000 HC SEMI PRIVATE

## 2019-01-21 PROCEDURE — S2900 ROBOTIC SURGICAL SYSTEM: HCPCS | Performed by: SURGERY

## 2019-01-21 PROCEDURE — 2580000003 HC RX 258: Performed by: SURGERY

## 2019-01-21 PROCEDURE — 8E0W3CZ ROBOTIC ASSISTED PROCEDURE OF TRUNK REGION, PERCUTANEOUS APPROACH: ICD-10-PCS | Performed by: SURGERY

## 2019-01-21 PROCEDURE — 94660 CPAP INITIATION&MGMT: CPT

## 2019-01-21 PROCEDURE — 3700000000 HC ANESTHESIA ATTENDED CARE: Performed by: SURGERY

## 2019-01-21 PROCEDURE — 2700000000 HC OXYGEN THERAPY PER DAY

## 2019-01-21 PROCEDURE — 6360000002 HC RX W HCPCS: Performed by: SURGERY

## 2019-01-21 PROCEDURE — 3600000019 HC SURGERY ROBOT ADDTL 15MIN: Performed by: SURGERY

## 2019-01-21 PROCEDURE — 2500000003 HC RX 250 WO HCPCS: Performed by: NURSE ANESTHETIST, CERTIFIED REGISTERED

## 2019-01-21 PROCEDURE — 6360000002 HC RX W HCPCS: Performed by: NURSE ANESTHETIST, CERTIFIED REGISTERED

## 2019-01-21 PROCEDURE — 0DJ08ZZ INSPECTION OF UPPER INTESTINAL TRACT, VIA NATURAL OR ARTIFICIAL OPENING ENDOSCOPIC: ICD-10-PCS | Performed by: SURGERY

## 2019-01-21 PROCEDURE — 0DB64Z3 EXCISION OF STOMACH, PERCUTANEOUS ENDOSCOPIC APPROACH, VERTICAL: ICD-10-PCS | Performed by: SURGERY

## 2019-01-21 PROCEDURE — 7100000001 HC PACU RECOVERY - ADDTL 15 MIN: Performed by: SURGERY

## 2019-01-21 PROCEDURE — 3600000009 HC SURGERY ROBOT BASE: Performed by: SURGERY

## 2019-01-21 PROCEDURE — 88307 TISSUE EXAM BY PATHOLOGIST: CPT

## 2019-01-21 PROCEDURE — 94762 N-INVAS EAR/PLS OXIMTRY CONT: CPT

## 2019-01-21 PROCEDURE — 80048 BASIC METABOLIC PNL TOTAL CA: CPT

## 2019-01-21 PROCEDURE — 84703 CHORIONIC GONADOTROPIN ASSAY: CPT

## 2019-01-21 PROCEDURE — 2709999900 HC NON-CHARGEABLE SUPPLY: Performed by: SURGERY

## 2019-01-21 PROCEDURE — S0028 INJECTION, FAMOTIDINE, 20 MG: HCPCS | Performed by: SURGERY

## 2019-01-21 PROCEDURE — 85027 COMPLETE CBC AUTOMATED: CPT

## 2019-01-21 PROCEDURE — 2780000010 HC IMPLANT OTHER: Performed by: SURGERY

## 2019-01-21 PROCEDURE — 6370000000 HC RX 637 (ALT 250 FOR IP): Performed by: SURGERY

## 2019-01-21 PROCEDURE — 94640 AIRWAY INHALATION TREATMENT: CPT

## 2019-01-21 PROCEDURE — 7100000000 HC PACU RECOVERY - FIRST 15 MIN: Performed by: SURGERY

## 2019-01-21 PROCEDURE — L0625 LO FLEX L1-BELOW L5 PRE OTS: HCPCS | Performed by: SURGERY

## 2019-01-21 PROCEDURE — 3700000001 HC ADD 15 MINUTES (ANESTHESIA): Performed by: SURGERY

## 2019-01-21 PROCEDURE — 2720000010 HC SURG SUPPLY STERILE: Performed by: SURGERY

## 2019-01-21 RX ORDER — SODIUM CHLORIDE, SODIUM LACTATE, POTASSIUM CHLORIDE, CALCIUM CHLORIDE 600; 310; 30; 20 MG/100ML; MG/100ML; MG/100ML; MG/100ML
INJECTION, SOLUTION INTRAVENOUS CONTINUOUS
Status: DISCONTINUED | OUTPATIENT
Start: 2019-01-21 | End: 2019-01-22 | Stop reason: HOSPADM

## 2019-01-21 RX ORDER — FENTANYL CITRATE 50 UG/ML
INJECTION, SOLUTION INTRAMUSCULAR; INTRAVENOUS PRN
Status: DISCONTINUED | OUTPATIENT
Start: 2019-01-21 | End: 2019-01-21 | Stop reason: SDUPTHER

## 2019-01-21 RX ORDER — IPRATROPIUM BROMIDE AND ALBUTEROL SULFATE 2.5; .5 MG/3ML; MG/3ML
1 SOLUTION RESPIRATORY (INHALATION)
Status: DISCONTINUED | OUTPATIENT
Start: 2019-01-21 | End: 2019-01-22 | Stop reason: HOSPADM

## 2019-01-21 RX ORDER — SCOLOPAMINE TRANSDERMAL SYSTEM 1 MG/1
1 PATCH, EXTENDED RELEASE TRANSDERMAL
Status: DISCONTINUED | OUTPATIENT
Start: 2019-01-21 | End: 2019-01-22 | Stop reason: HOSPADM

## 2019-01-21 RX ORDER — LIDOCAINE HYDROCHLORIDE 10 MG/ML
INJECTION, SOLUTION EPIDURAL; INFILTRATION; INTRACAUDAL; PERINEURAL PRN
Status: DISCONTINUED | OUTPATIENT
Start: 2019-01-21 | End: 2019-01-21 | Stop reason: SDUPTHER

## 2019-01-21 RX ORDER — PROMETHAZINE HYDROCHLORIDE 25 MG/1
25 TABLET ORAL EVERY 6 HOURS PRN
Qty: 30 TABLET | Refills: 0 | Status: SHIPPED | OUTPATIENT
Start: 2019-01-21 | End: 2020-06-18

## 2019-01-21 RX ORDER — SODIUM CHLORIDE 9 MG/ML
125 INJECTION, SOLUTION INTRAVENOUS CONTINUOUS
Status: DISCONTINUED | OUTPATIENT
Start: 2019-01-21 | End: 2019-01-21

## 2019-01-21 RX ORDER — PROMETHAZINE HYDROCHLORIDE 25 MG/ML
12.5 INJECTION, SOLUTION INTRAMUSCULAR; INTRAVENOUS EVERY 6 HOURS PRN
Status: DISCONTINUED | OUTPATIENT
Start: 2019-01-21 | End: 2019-01-22 | Stop reason: HOSPADM

## 2019-01-21 RX ORDER — FENTANYL CITRATE 50 UG/ML
25 INJECTION, SOLUTION INTRAMUSCULAR; INTRAVENOUS EVERY 5 MIN PRN
Status: COMPLETED | OUTPATIENT
Start: 2019-01-21 | End: 2019-01-21

## 2019-01-21 RX ORDER — HYDROCODONE BITARTRATE AND ACETAMINOPHEN 5; 325 MG/1; MG/1
TABLET ORAL
Refills: 0 | Status: ON HOLD | COMMUNITY
Start: 2019-01-15 | End: 2019-01-22 | Stop reason: HOSPADM

## 2019-01-21 RX ORDER — BUPIVACAINE HYDROCHLORIDE 5 MG/ML
INJECTION, SOLUTION EPIDURAL; INTRACAUDAL PRN
Status: DISCONTINUED | OUTPATIENT
Start: 2019-01-21 | End: 2019-01-21 | Stop reason: HOSPADM

## 2019-01-21 RX ORDER — SODIUM CHLORIDE 0.9 % (FLUSH) 0.9 %
10 SYRINGE (ML) INJECTION EVERY 12 HOURS SCHEDULED
Status: DISCONTINUED | OUTPATIENT
Start: 2019-01-21 | End: 2019-01-22 | Stop reason: HOSPADM

## 2019-01-21 RX ORDER — SODIUM CHLORIDE, SODIUM LACTATE, POTASSIUM CHLORIDE, CALCIUM CHLORIDE 600; 310; 30; 20 MG/100ML; MG/100ML; MG/100ML; MG/100ML
1000 INJECTION, SOLUTION INTRAVENOUS CONTINUOUS
Status: DISCONTINUED | OUTPATIENT
Start: 2019-01-21 | End: 2019-01-21

## 2019-01-21 RX ORDER — ACETAMINOPHEN 325 MG/1
650 TABLET ORAL EVERY 4 HOURS PRN
Status: DISCONTINUED | OUTPATIENT
Start: 2019-01-21 | End: 2019-01-21

## 2019-01-21 RX ORDER — HEPARIN SODIUM 5000 [USP'U]/ML
5000 INJECTION, SOLUTION INTRAVENOUS; SUBCUTANEOUS ONCE
Status: COMPLETED | OUTPATIENT
Start: 2019-01-21 | End: 2019-01-21

## 2019-01-21 RX ORDER — ONDANSETRON 2 MG/ML
4 INJECTION INTRAMUSCULAR; INTRAVENOUS EVERY 6 HOURS PRN
Status: DISCONTINUED | OUTPATIENT
Start: 2019-01-21 | End: 2019-01-22 | Stop reason: HOSPADM

## 2019-01-21 RX ORDER — ACETAMINOPHEN 10 MG/ML
INJECTION, SOLUTION INTRAVENOUS PRN
Status: DISCONTINUED | OUTPATIENT
Start: 2019-01-21 | End: 2019-01-21 | Stop reason: SDUPTHER

## 2019-01-21 RX ORDER — ONDANSETRON 2 MG/ML
4 INJECTION INTRAMUSCULAR; INTRAVENOUS
Status: COMPLETED | OUTPATIENT
Start: 2019-01-21 | End: 2019-01-21

## 2019-01-21 RX ORDER — DIPHENHYDRAMINE HYDROCHLORIDE 50 MG/ML
12.5 INJECTION INTRAMUSCULAR; INTRAVENOUS
Status: DISCONTINUED | OUTPATIENT
Start: 2019-01-21 | End: 2019-01-21 | Stop reason: HOSPADM

## 2019-01-21 RX ORDER — MIDAZOLAM HYDROCHLORIDE 1 MG/ML
INJECTION INTRAMUSCULAR; INTRAVENOUS PRN
Status: DISCONTINUED | OUTPATIENT
Start: 2019-01-21 | End: 2019-01-21 | Stop reason: SDUPTHER

## 2019-01-21 RX ORDER — PROMETHAZINE HYDROCHLORIDE 25 MG/ML
6.25 INJECTION, SOLUTION INTRAMUSCULAR; INTRAVENOUS
Status: COMPLETED | OUTPATIENT
Start: 2019-01-21 | End: 2019-01-21

## 2019-01-21 RX ORDER — CEFAZOLIN SODIUM 2 G/50ML
SOLUTION INTRAVENOUS PRN
Status: DISCONTINUED | OUTPATIENT
Start: 2019-01-21 | End: 2019-01-21 | Stop reason: SDUPTHER

## 2019-01-21 RX ORDER — OXYCODONE HYDROCHLORIDE AND ACETAMINOPHEN 5; 325 MG/1; MG/1
1 TABLET ORAL EVERY 6 HOURS PRN
Qty: 28 TABLET | Refills: 0 | Status: SHIPPED | OUTPATIENT
Start: 2019-01-21 | End: 2019-01-28

## 2019-01-21 RX ORDER — FENTANYL CITRATE 50 UG/ML
50 INJECTION, SOLUTION INTRAMUSCULAR; INTRAVENOUS EVERY 5 MIN PRN
Status: COMPLETED | OUTPATIENT
Start: 2019-01-21 | End: 2019-01-21

## 2019-01-21 RX ORDER — ROCURONIUM BROMIDE 10 MG/ML
INJECTION, SOLUTION INTRAVENOUS PRN
Status: DISCONTINUED | OUTPATIENT
Start: 2019-01-21 | End: 2019-01-21 | Stop reason: SDUPTHER

## 2019-01-21 RX ORDER — NEOSTIGMINE METHYLSULFATE 1 MG/ML
INJECTION, SOLUTION INTRAVENOUS PRN
Status: DISCONTINUED | OUTPATIENT
Start: 2019-01-21 | End: 2019-01-21 | Stop reason: SDUPTHER

## 2019-01-21 RX ORDER — MIDAZOLAM HYDROCHLORIDE 1 MG/ML
1 INJECTION INTRAMUSCULAR; INTRAVENOUS EVERY 5 MIN PRN
Status: COMPLETED | OUTPATIENT
Start: 2019-01-21 | End: 2019-01-21

## 2019-01-21 RX ORDER — SODIUM CHLORIDE 0.9 % (FLUSH) 0.9 %
10 SYRINGE (ML) INJECTION PRN
Status: DISCONTINUED | OUTPATIENT
Start: 2019-01-21 | End: 2019-01-22 | Stop reason: HOSPADM

## 2019-01-21 RX ORDER — MAGNESIUM HYDROXIDE 1200 MG/15ML
LIQUID ORAL CONTINUOUS PRN
Status: COMPLETED | OUTPATIENT
Start: 2019-01-21 | End: 2019-01-21

## 2019-01-21 RX ORDER — PROPOFOL 10 MG/ML
INJECTION, EMULSION INTRAVENOUS PRN
Status: DISCONTINUED | OUTPATIENT
Start: 2019-01-21 | End: 2019-01-21 | Stop reason: SDUPTHER

## 2019-01-21 RX ORDER — HEPARIN SODIUM 5000 [USP'U]/ML
5000 INJECTION, SOLUTION INTRAVENOUS; SUBCUTANEOUS EVERY 8 HOURS SCHEDULED
Status: DISCONTINUED | OUTPATIENT
Start: 2019-01-21 | End: 2019-01-22 | Stop reason: HOSPADM

## 2019-01-21 RX ORDER — GLYCOPYRROLATE 1 MG/5 ML
SYRINGE (ML) INTRAVENOUS PRN
Status: DISCONTINUED | OUTPATIENT
Start: 2019-01-21 | End: 2019-01-21 | Stop reason: SDUPTHER

## 2019-01-21 RX ADMIN — FENTANYL CITRATE 25 MCG: 50 INJECTION, SOLUTION INTRAMUSCULAR; INTRAVENOUS at 16:10

## 2019-01-21 RX ADMIN — Medication 0.4 MG: at 13:42

## 2019-01-21 RX ADMIN — Medication 3 MG: at 13:42

## 2019-01-21 RX ADMIN — MIDAZOLAM HYDROCHLORIDE 1 MG: 1 INJECTION, SOLUTION INTRAMUSCULAR; INTRAVENOUS at 10:33

## 2019-01-21 RX ADMIN — SODIUM CHLORIDE, POTASSIUM CHLORIDE, SODIUM LACTATE AND CALCIUM CHLORIDE: 600; 310; 30; 20 INJECTION, SOLUTION INTRAVENOUS at 20:53

## 2019-01-21 RX ADMIN — FENTANYL CITRATE 50 MCG: 50 INJECTION, SOLUTION INTRAMUSCULAR; INTRAVENOUS at 15:15

## 2019-01-21 RX ADMIN — HEPARIN SODIUM 5000 UNITS: 5000 INJECTION, SOLUTION INTRAVENOUS; SUBCUTANEOUS at 10:15

## 2019-01-21 RX ADMIN — PROPOFOL 200 MG: 10 INJECTION, EMULSION INTRAVENOUS at 12:16

## 2019-01-21 RX ADMIN — FENTANYL CITRATE 25 MCG: 50 INJECTION, SOLUTION INTRAMUSCULAR; INTRAVENOUS at 16:22

## 2019-01-21 RX ADMIN — HYDROMORPHONE HYDROCHLORIDE 1 MG: 1 INJECTION, SOLUTION INTRAMUSCULAR; INTRAVENOUS; SUBCUTANEOUS at 18:56

## 2019-01-21 RX ADMIN — FENTANYL CITRATE 100 MCG: 50 INJECTION, SOLUTION INTRAMUSCULAR; INTRAVENOUS at 12:19

## 2019-01-21 RX ADMIN — Medication 3 G: at 22:18

## 2019-01-21 RX ADMIN — MIDAZOLAM HYDROCHLORIDE 1 MG: 1 INJECTION, SOLUTION INTRAMUSCULAR; INTRAVENOUS at 11:18

## 2019-01-21 RX ADMIN — Medication 3 G: at 12:25

## 2019-01-21 RX ADMIN — FENTANYL CITRATE 25 MCG: 50 INJECTION, SOLUTION INTRAMUSCULAR; INTRAVENOUS at 16:15

## 2019-01-21 RX ADMIN — HEPARIN SODIUM 5000 UNITS: 5000 INJECTION INTRAVENOUS; SUBCUTANEOUS at 22:18

## 2019-01-21 RX ADMIN — FENTANYL CITRATE 50 MCG: 50 INJECTION, SOLUTION INTRAMUSCULAR; INTRAVENOUS at 15:20

## 2019-01-21 RX ADMIN — FENTANYL CITRATE 25 MCG: 50 INJECTION, SOLUTION INTRAMUSCULAR; INTRAVENOUS at 16:05

## 2019-01-21 RX ADMIN — ROCURONIUM BROMIDE 50 MG: 10 INJECTION INTRAVENOUS at 12:16

## 2019-01-21 RX ADMIN — SODIUM CHLORIDE, POTASSIUM CHLORIDE, SODIUM LACTATE AND CALCIUM CHLORIDE: 600; 310; 30; 20 INJECTION, SOLUTION INTRAVENOUS at 13:35

## 2019-01-21 RX ADMIN — Medication 10 ML: at 22:18

## 2019-01-21 RX ADMIN — HYDROMORPHONE HYDROCHLORIDE 1 MG: 1 INJECTION, SOLUTION INTRAMUSCULAR; INTRAVENOUS; SUBCUTANEOUS at 22:18

## 2019-01-21 RX ADMIN — IPRATROPIUM BROMIDE AND ALBUTEROL SULFATE 1 AMPULE: .5; 3 SOLUTION RESPIRATORY (INHALATION) at 22:24

## 2019-01-21 RX ADMIN — CEFAZOLIN SODIUM 3 G: 2 SOLUTION INTRAVENOUS at 12:25

## 2019-01-21 RX ADMIN — PHENYLEPHRINE HYDROCHLORIDE 200 MCG: 10 INJECTION INTRAVENOUS at 12:28

## 2019-01-21 RX ADMIN — FENTANYL CITRATE 50 MCG: 50 INJECTION, SOLUTION INTRAMUSCULAR; INTRAVENOUS at 14:15

## 2019-01-21 RX ADMIN — ONDANSETRON 4 MG: 2 INJECTION INTRAMUSCULAR; INTRAVENOUS at 13:35

## 2019-01-21 RX ADMIN — ROCURONIUM BROMIDE 20 MG: 10 INJECTION INTRAVENOUS at 13:10

## 2019-01-21 RX ADMIN — MIDAZOLAM HYDROCHLORIDE 2 MG: 1 INJECTION, SOLUTION INTRAMUSCULAR; INTRAVENOUS at 12:16

## 2019-01-21 RX ADMIN — PROMETHAZINE HYDROCHLORIDE 6.25 MG: 25 INJECTION INTRAMUSCULAR; INTRAVENOUS at 14:39

## 2019-01-21 RX ADMIN — FENTANYL CITRATE 100 MCG: 50 INJECTION, SOLUTION INTRAMUSCULAR; INTRAVENOUS at 13:15

## 2019-01-21 RX ADMIN — SODIUM CHLORIDE, POTASSIUM CHLORIDE, SODIUM LACTATE AND CALCIUM CHLORIDE: 600; 310; 30; 20 INJECTION, SOLUTION INTRAVENOUS at 12:12

## 2019-01-21 RX ADMIN — FAMOTIDINE 20 MG: 10 INJECTION, SOLUTION INTRAVENOUS at 22:18

## 2019-01-21 RX ADMIN — SODIUM CHLORIDE, POTASSIUM CHLORIDE, SODIUM LACTATE AND CALCIUM CHLORIDE 1000 ML: 600; 310; 30; 20 INJECTION, SOLUTION INTRAVENOUS at 10:21

## 2019-01-21 RX ADMIN — LIDOCAINE HYDROCHLORIDE 50 MG: 10 INJECTION, SOLUTION EPIDURAL; INFILTRATION; INTRACAUDAL; PERINEURAL at 12:16

## 2019-01-21 RX ADMIN — ACETAMINOPHEN 1000 MG: 10 INJECTION, SOLUTION INTRAVENOUS at 12:38

## 2019-01-21 RX ADMIN — FENTANYL CITRATE 50 MCG: 50 INJECTION, SOLUTION INTRAMUSCULAR; INTRAVENOUS at 14:25

## 2019-01-21 ASSESSMENT — PULMONARY FUNCTION TESTS
PIF_VALUE: 33
PIF_VALUE: 34
PIF_VALUE: 35
PIF_VALUE: 32
PIF_VALUE: 1
PIF_VALUE: 27
PIF_VALUE: 32
PIF_VALUE: 34
PIF_VALUE: 0
PIF_VALUE: 19
PIF_VALUE: 26
PIF_VALUE: 35
PIF_VALUE: 34
PIF_VALUE: 33
PIF_VALUE: 26
PIF_VALUE: 28
PIF_VALUE: 33
PIF_VALUE: 34
PIF_VALUE: 28
PIF_VALUE: 34
PIF_VALUE: 19
PIF_VALUE: 34
PIF_VALUE: 34
PIF_VALUE: 35
PIF_VALUE: 34
PIF_VALUE: 31
PIF_VALUE: 33
PIF_VALUE: 34
PIF_VALUE: 17
PIF_VALUE: 30
PIF_VALUE: 34
PIF_VALUE: 34
PIF_VALUE: 35
PIF_VALUE: 29
PIF_VALUE: 23
PIF_VALUE: 29
PIF_VALUE: 19
PIF_VALUE: 19
PIF_VALUE: 34
PIF_VALUE: 19
PIF_VALUE: 35
PIF_VALUE: 35
PIF_VALUE: 24
PIF_VALUE: 19
PIF_VALUE: 34
PIF_VALUE: 28
PIF_VALUE: 34
PIF_VALUE: 29
PIF_VALUE: 35
PIF_VALUE: 32
PIF_VALUE: 19
PIF_VALUE: 36
PIF_VALUE: 32
PIF_VALUE: 32
PIF_VALUE: 34
PIF_VALUE: 28
PIF_VALUE: 19
PIF_VALUE: 35
PIF_VALUE: 35
PIF_VALUE: 28
PIF_VALUE: 29
PIF_VALUE: 30
PIF_VALUE: 33
PIF_VALUE: 18
PIF_VALUE: 33
PIF_VALUE: 34
PIF_VALUE: 33
PIF_VALUE: 28
PIF_VALUE: 27
PIF_VALUE: 33
PIF_VALUE: 3
PIF_VALUE: 34
PIF_VALUE: 33
PIF_VALUE: 29
PIF_VALUE: 35
PIF_VALUE: 3
PIF_VALUE: 19
PIF_VALUE: 34
PIF_VALUE: 19
PIF_VALUE: 3
PIF_VALUE: 18
PIF_VALUE: 33
PIF_VALUE: 3
PIF_VALUE: 34
PIF_VALUE: 1
PIF_VALUE: 35
PIF_VALUE: 28
PIF_VALUE: 15
PIF_VALUE: 30
PIF_VALUE: 34
PIF_VALUE: 53
PIF_VALUE: 35
PIF_VALUE: 32
PIF_VALUE: 28
PIF_VALUE: 3
PIF_VALUE: 34
PIF_VALUE: 1
PIF_VALUE: 32
PIF_VALUE: 1
PIF_VALUE: 33
PIF_VALUE: 29
PIF_VALUE: 28
PIF_VALUE: 33
PIF_VALUE: 25
PIF_VALUE: 2
PIF_VALUE: 34
PIF_VALUE: 34
PIF_VALUE: 5
PIF_VALUE: 23
PIF_VALUE: 28
PIF_VALUE: 34
PIF_VALUE: 34

## 2019-01-21 ASSESSMENT — PAIN SCALES - GENERAL
PAINLEVEL_OUTOF10: 7
PAINLEVEL_OUTOF10: 6
PAINLEVEL_OUTOF10: 8
PAINLEVEL_OUTOF10: 10
PAINLEVEL_OUTOF10: 10
PAINLEVEL_OUTOF10: 6
PAINLEVEL_OUTOF10: 10
PAINLEVEL_OUTOF10: 10
PAINLEVEL_OUTOF10: 8
PAINLEVEL_OUTOF10: 10

## 2019-01-21 ASSESSMENT — PAIN DESCRIPTION - PAIN TYPE
TYPE: SURGICAL PAIN;ACUTE PAIN
TYPE: SURGICAL PAIN

## 2019-01-21 ASSESSMENT — PAIN DESCRIPTION - DESCRIPTORS
DESCRIPTORS: ACHING;DULL
DESCRIPTORS: CONSTANT;ACHING
DESCRIPTORS: ACHING;DULL

## 2019-01-21 ASSESSMENT — PAIN DESCRIPTION - LOCATION
LOCATION: ABDOMEN
LOCATION: ABDOMEN

## 2019-01-21 ASSESSMENT — ENCOUNTER SYMPTOMS
SHORTNESS OF BREATH: 0
STRIDOR: 0

## 2019-01-21 ASSESSMENT — PAIN - FUNCTIONAL ASSESSMENT: PAIN_FUNCTIONAL_ASSESSMENT: 0-10

## 2019-01-21 NOTE — INTERVAL H&P NOTE
Pt Name: Wilmer Vasquez  MRN: [id number]  YOB: 1975  Date of evaluation: 1/21/2019    I have reviewed the patient's history and physical examination completed in pre-admission testing on 1/9/19    No changes to history or on examination today, unless noted below. None. PAT vital signs in epic flowsheet.      WILLIAM SOLER CNP  1/21/19  9:45 AM

## 2019-01-21 NOTE — BRIEF OP NOTE
Brief Postoperative Note  ______________________________________________________________    Patient: Reynaldo Cintron  YOB: 1975  MRN: [id number]  Date of Procedure: 1/21/2019    Pre-Op Diagnosis: MORBID OBESITY, OBSTRUCTIVE SLEEP APNEA, GERD     Post-Op Diagnosis: Same       Procedure(s):  XI ROBOTIC GASTRECTOMY SLEEVE LAPAROSCOPIC, EGD    Anesthesia: General    Surgeon(s):  Donzetta Nyhan, DO    Assistant: Stormy Lindsay    Estimated Blood Loss (mL): less than 50     Complications: None    Specimens:   ID Type Source Tests Collected by Time Destination   A : PORTION OF STOMACH Tissue Stomach SURGICAL PATHOLOGY Donzetta Nyhan, DO 1/21/2019 1326        Implants:  * No implants in log *      Drains:   Closed/Suction Drain Right Abdomen;RUQ Bulb [address] (Active)       Findings: see note    Donzetta Nyhan, DO  Date: 1/21/2019  Time: 1:47 PM

## 2019-01-21 NOTE — H&P (VIEW-ONLY)
History and Physical    Pt Name: Saul Glez  MRN: [id number]  YOB: 1975  Date of evaluation: 1/9/2019  Primary Care Physician: XOCHITL Gregory CNP  Patient evaluated at the request of  Dr. Jaelyn Manuel    Reason for evaluation:  Morbid obesity     SUBJECTIVE:   History of Chief Complaint:    Kinsey Erickson is a 41 y.o. female who has struggled with her weight for many years Her highest weight has Been 383 lbs approx ,  her lowest weight in the last 5 yrs has been 290 lbs ,  she has tried diet and exercise , yet unable to achieve adequate weight removal .         Past Medical History      has a past medical history of Anemia; Chronic back pain; Diabetes mellitus type 2 in [facility]); GERD (gastroesophageal reflux disease); Hip pain, chronic; Hyperlipidemia; Morbid obesity ([address] ZAID (obstructive sleep apnea); Osteoarthritis; Syncope; Trichomonas contact, treated; and Urinary incontinence. Past Surgical History     has a past surgical history that includes hip surgery (Bilateral). Medications  Current Meds:   Current Outpatient Rx   Medication Sig Dispense Refill    calcium carbonate (TUMS) 500 MG chewable tablet Take 2 tablets by mouth daily      Multiple Vitamins-Minerals (THERAPEUTIC MULTIVITAMIN-MINERALS) tablet Take 1 tablet by mouth 2 times daily      oxyCODONE-acetaminophen (PERCOCET) 5-325 MG per tablet Take 1 tablet by mouth every 4 hours as needed for Pain. Nellene Rain metFORMIN (GLUCOPHAGE) 500 MG tablet Take 2 tablets by mouth 2 times daily (with meals) 120 tablet 5     Continuous Infusions:  PRN Meds:. Allergies  is allergic to motrin [ibuprofen] and shellfish-derived products.     Family History  family history includes Arthritis in her mother; Depression in her mother; Diabetes in her daughter, maternal aunt, maternal uncle, maternal uncle, mother, paternal aunt, and paternal grandmother; Heart Failure in her paternal grandmother and paternal uncle; High Blood Pressure in her father, maternal aunt, maternal grandfather, and paternal aunt; High Cholesterol in her brother and mother; Hypertension in her mother; Kidney Disease in her mother and paternal aunt; No Known Problems in her maternal grandmother; Other in her daughter; Schizophrenia in her brother. Family Status   Relation Status    Father Alive    Mother Alive    [address]    [address] Brother Alive    MGM     MGF     PGM     PGF     Brother Alive    PUnc     PAunt Alive    MUnc Alive    Errol Alive    Son Alive    Errol Alive    Errol Alive         Social History  Social History     Social History    Marital status:      Spouse name: Viraj Abdi Number of children: 10    Years of education: N/A     Occupational History    RN      [employer]     Social History Main Topics    Smoking status: Former Smoker     Packs/day: 1.00     Years: 5.00     Types: Cigars     Start date:      Quit date: 2013    Smokeless tobacco: Never Used    Alcohol use No    Drug use: No    Sexual activity: Yes     Partners: Male     Other Topics Concern    Not on file     Social History Narrative    No narrative on file            Occupation: nurse    Hobbies : [employer]   OBJECTIVE:   VITALS:  vitals were not taken for this visit. CONSTITUTIONAL: Alert and oriented times 3, no acute distress and cooperative to examination. Friendly and pleasant     SKIN: rash No    HEENT: Head is normocephalic, atraumatic. EOMI, PERRLA    Oral air way :slightly narrow Yes    NECK: neck supple, no lymphadenopathy noted, trachea midline and straight      2+ carotid, no bruit    LUNGS: Chest expands equally bilaterally upon respiration, no accessory muscle used. Ausculation reveals no adventitious breath sounds.     CARDIOVASCULAR: \"Heart sounds are normal.  Regular rate and rhythm without murmur,    ABDOMEN: Bowel sounds are present in all four quadrants  , morbid obesity    GENATALIA:Deferred. NEUROLOGIC: \"CN II-XII are grossly intact.      EXTREMITIES: Pitting edema:  Yes no pitting ,    Varicose veins: No     Dorsal pedal/posterior tibial pulses palpable: No     Strength : Normal           Patient Active Problem List   Diagnosis    Neurocardiogenic syncope    Diabetes mellitus type 2 in obese (HCC)    Anemia    Numbness and tingling in both hands    Morbid obesity with BMI of 60.0-69.9, adult ([facility]    Hip pain    Vitamin D deficiency    Gallbladder calculus without cholecystitis    ZAID (obstructive sleep apnea)    Chronic pain of left knee                   Scheduled for:   XI davinci gastrectomy laparoscopic , liver bx , EGD         CARLO BARRETT PAC  Electronically signed 1/9/2019 at 3:13 PM       :

## 2019-01-22 ENCOUNTER — APPOINTMENT (OUTPATIENT)
Dept: GENERAL RADIOLOGY | Age: 44
DRG: 403 | End: 2019-01-22
Attending: SURGERY
Payer: MEDICARE

## 2019-01-22 VITALS
DIASTOLIC BLOOD PRESSURE: 92 MMHG | WEIGHT: 293 LBS | HEART RATE: 76 BPM | TEMPERATURE: 98.6 F | HEIGHT: 63 IN | OXYGEN SATURATION: 99 % | RESPIRATION RATE: 17 BRPM | SYSTOLIC BLOOD PRESSURE: 155 MMHG | BODY MASS INDEX: 51.91 KG/M2

## 2019-01-22 LAB
ANION GAP SERPL CALCULATED.3IONS-SCNC: 11 MMOL/L (ref 9–17)
BUN BLDV-MCNC: 4 MG/DL (ref 6–20)
BUN/CREAT BLD: ABNORMAL (ref 9–20)
CALCIUM SERPL-MCNC: 8.7 MG/DL (ref 8.6–10.4)
CHLORIDE BLD-SCNC: 95 MMOL/L (ref 98–107)
CO2: 26 MMOL/L (ref 20–31)
CREAT SERPL-MCNC: 0.65 MG/DL (ref 0.5–0.9)
GFR AFRICAN AMERICAN: >60 ML/MIN
GFR NON-AFRICAN AMERICAN: >60 ML/MIN
GFR SERPL CREATININE-BSD FRML MDRD: ABNORMAL ML/MIN/{1.73_M2}
GFR SERPL CREATININE-BSD FRML MDRD: ABNORMAL ML/MIN/{1.73_M2}
GLUCOSE BLD-MCNC: 101 MG/DL (ref 65–105)
GLUCOSE BLD-MCNC: 114 MG/DL (ref 65–105)
GLUCOSE BLD-MCNC: 121 MG/DL (ref 70–99)
GLUCOSE BLD-MCNC: 84 MG/DL (ref 65–105)
HCG, PREGNANCY URINE (POC): NEGATIVE
HCT VFR BLD CALC: 38.7 % (ref 36.3–47.1)
HEMOGLOBIN: 12 G/DL (ref 11.9–15.1)
MCH RBC QN AUTO: 27.8 PG (ref 25.2–33.5)
MCHC RBC AUTO-ENTMCNC: 31 G/DL (ref 28.4–34.8)
MCV RBC AUTO: 89.8 FL (ref 82.6–102.9)
NRBC AUTOMATED: 0 PER 100 WBC
PDW BLD-RTO: 13.1 % (ref 11.8–14.4)
PLATELET # BLD: 420 K/UL (ref 138–453)
PMV BLD AUTO: 9.7 FL (ref 8.1–13.5)
POTASSIUM SERPL-SCNC: 4.2 MMOL/L (ref 3.7–5.3)
RBC # BLD: 4.31 M/UL (ref 3.95–5.11)
SODIUM BLD-SCNC: 132 MMOL/L (ref 135–144)
WBC # BLD: 10.3 K/UL (ref 3.5–11.3)

## 2019-01-22 PROCEDURE — 96372 THER/PROPH/DIAG INJ SC/IM: CPT

## 2019-01-22 PROCEDURE — S0028 INJECTION, FAMOTIDINE, 20 MG: HCPCS | Performed by: SURGERY

## 2019-01-22 PROCEDURE — 74220 X-RAY XM ESOPHAGUS 1CNTRST: CPT

## 2019-01-22 PROCEDURE — 6360000004 HC RX CONTRAST MEDICATION: Performed by: SURGERY

## 2019-01-22 PROCEDURE — 96374 THER/PROPH/DIAG INJ IV PUSH: CPT

## 2019-01-22 PROCEDURE — 2700000000 HC OXYGEN THERAPY PER DAY

## 2019-01-22 PROCEDURE — 80048 BASIC METABOLIC PNL TOTAL CA: CPT

## 2019-01-22 PROCEDURE — 94762 N-INVAS EAR/PLS OXIMTRY CONT: CPT

## 2019-01-22 PROCEDURE — 94660 CPAP INITIATION&MGMT: CPT

## 2019-01-22 PROCEDURE — 6370000000 HC RX 637 (ALT 250 FOR IP): Performed by: SURGERY

## 2019-01-22 PROCEDURE — G0378 HOSPITAL OBSERVATION PER HR: HCPCS

## 2019-01-22 PROCEDURE — 2580000003 HC RX 258: Performed by: SURGERY

## 2019-01-22 PROCEDURE — 96376 TX/PRO/DX INJ SAME DRUG ADON: CPT

## 2019-01-22 PROCEDURE — 96375 TX/PRO/DX INJ NEW DRUG ADDON: CPT

## 2019-01-22 PROCEDURE — 85027 COMPLETE CBC AUTOMATED: CPT

## 2019-01-22 PROCEDURE — 6360000002 HC RX W HCPCS: Performed by: SURGERY

## 2019-01-22 PROCEDURE — 36415 COLL VENOUS BLD VENIPUNCTURE: CPT

## 2019-01-22 PROCEDURE — 43775 LAP SLEEVE GASTRECTOMY: CPT | Performed by: SURGERY

## 2019-01-22 PROCEDURE — 82947 ASSAY GLUCOSE BLOOD QUANT: CPT

## 2019-01-22 PROCEDURE — 94640 AIRWAY INHALATION TREATMENT: CPT

## 2019-01-22 RX ORDER — PROMETHAZINE HYDROCHLORIDE 25 MG/1
25 TABLET ORAL EVERY 6 HOURS PRN
Status: DISCONTINUED | OUTPATIENT
Start: 2019-01-22 | End: 2019-01-22 | Stop reason: HOSPADM

## 2019-01-22 RX ORDER — OXYCODONE HYDROCHLORIDE AND ACETAMINOPHEN 5; 325 MG/1; MG/1
2 TABLET ORAL EVERY 4 HOURS PRN
Status: DISCONTINUED | OUTPATIENT
Start: 2019-01-22 | End: 2019-01-22 | Stop reason: HOSPADM

## 2019-01-22 RX ADMIN — SODIUM CHLORIDE, POTASSIUM CHLORIDE, SODIUM LACTATE AND CALCIUM CHLORIDE: 600; 310; 30; 20 INJECTION, SOLUTION INTRAVENOUS at 05:34

## 2019-01-22 RX ADMIN — SODIUM CHLORIDE, POTASSIUM CHLORIDE, SODIUM LACTATE AND CALCIUM CHLORIDE: 600; 310; 30; 20 INJECTION, SOLUTION INTRAVENOUS at 14:23

## 2019-01-22 RX ADMIN — HEPARIN SODIUM 5000 UNITS: 5000 INJECTION INTRAVENOUS; SUBCUTANEOUS at 14:24

## 2019-01-22 RX ADMIN — IPRATROPIUM BROMIDE AND ALBUTEROL SULFATE 1 AMPULE: .5; 3 SOLUTION RESPIRATORY (INHALATION) at 14:05

## 2019-01-22 RX ADMIN — OXYCODONE HYDROCHLORIDE AND ACETAMINOPHEN 2 TABLET: 5; 325 TABLET ORAL at 09:57

## 2019-01-22 RX ADMIN — HYDROMORPHONE HYDROCHLORIDE 1 MG: 1 INJECTION, SOLUTION INTRAMUSCULAR; INTRAVENOUS; SUBCUTANEOUS at 01:04

## 2019-01-22 RX ADMIN — HEPARIN SODIUM 5000 UNITS: 5000 INJECTION INTRAVENOUS; SUBCUTANEOUS at 05:34

## 2019-01-22 RX ADMIN — Medication 3 G: at 05:34

## 2019-01-22 RX ADMIN — HYDROMORPHONE HYDROCHLORIDE 1 MG: 1 INJECTION, SOLUTION INTRAMUSCULAR; INTRAVENOUS; SUBCUTANEOUS at 05:29

## 2019-01-22 RX ADMIN — OXYCODONE HYDROCHLORIDE AND ACETAMINOPHEN 2 TABLET: 5; 325 TABLET ORAL at 14:23

## 2019-01-22 RX ADMIN — FAMOTIDINE 20 MG: 10 INJECTION, SOLUTION INTRAVENOUS at 08:37

## 2019-01-22 RX ADMIN — IPRATROPIUM BROMIDE AND ALBUTEROL SULFATE 1 AMPULE: .5; 3 SOLUTION RESPIRATORY (INHALATION) at 08:42

## 2019-01-22 RX ADMIN — IOHEXOL 30 ML: 240 INJECTION, SOLUTION INTRATHECAL; INTRAVASCULAR; INTRAVENOUS; ORAL at 08:08

## 2019-01-22 ASSESSMENT — PAIN SCALES - GENERAL
PAINLEVEL_OUTOF10: 7
PAINLEVEL_OUTOF10: 7
PAINLEVEL_OUTOF10: 5
PAINLEVEL_OUTOF10: 6
PAINLEVEL_OUTOF10: 3
PAINLEVEL_OUTOF10: 7

## 2019-01-22 NOTE — PROGRESS NOTES
CLINICAL PHARMACY NOTE: MEDS TO [address] Select Patient?: Yes  Total # of Prescriptions Filled: 3   The following medications were delivered to the patient:  · PROMETHAZINE 25MG  · ENOXAPARIN 60MG/0.6ML   · OXYCODONE-ACETAMINOPHEN 5/325MG  Total # of Interventions Completed: 1  Time Spent (min): 60    Additional Documentation:WE HAD TO PERFECT SERVE THE DOCTOR ON THE ENOXAPARIN TO CLARIFY QUANITY AND DIRECTIONS ,THEN WE DELIVERED TO THE ROOM AND OUR PY2 STUDENT WENT WITH US TO DELIVER AND DID PATIENT COUNSELING.

## 2019-01-22 NOTE — CARE COORDINATION
Case Management Initial Discharge Plan  Gaudencio Olson,             Met with:patient to discuss discharge plans. Information verified: address, contacts, phone number, , insurance Yes  PCP: XOCHITL Rose CNP  Date of last visit:     Insurance Provider: [employer]    Discharge Planning    Living Arrangements:  Spouse/Significant Other, Children   Support Systems:  Spouse/Significant Other, Family Members, Children    Home has 2 stories  3 stairs to climb to get into front door, 1 flight of stairs to climb to reach second floor  Location of bedroom/bathroom in home second floor    Patient able to perform ADL's:Independent    Current Services (outpatient & in home) dme  DME equipment: cpap  DME provider: patient is unsure    Pharmacy: [facility] [address] Medications:  No  Does patient want to participate in local refill/ meds to beds program?  Yes    Potential Assistance Needed:  N/A    Patient agreeable to home care: No  [place] of choice provided:  no    Prior SNF/Rehab Placement and Facility:   Agreeable to SNF/Rehab: No  [place] of choice provided: no   Evaluation: no    Expected Discharge date:  19  Patient expects to be discharged to:  Home  Follow Up Appointment: Best Day/ Time: Monday AM    Transportation provider:   Transportation arrangements needed for discharge: No    Readmission Risk              Risk of Unplanned Readmission:        7             Does patient have a readmission risk score greater than 14?: No  If yes, follow-up appointment must be made within 7 days of discharge.      Discharge Plan: Home          Electronically signed by Mehul Talley RN on 19 at 7:29 AM [phone]

## 2019-01-22 NOTE — PROGRESS NOTES
Smoking Cessation - topics covered   []  Health Risks  []  Benefits of Quitting   []  Smoking Cessation  []  Patient has no history of tobacco use  [x]  Patient is former smoker. Patient quit in 2013. [x]  No need for tobacco cessation education. []  Booklet given  []  Patient verbalizes understanding. []  Patient denies need for tobacco cessation education. []  Unable to meet with patient today. Will follow up as able.   Juan José Mancilla  9:05 AM

## 2019-01-22 NOTE — PROGRESS NOTES
General Surgery:  Daily Progress Note          POD # 1 s/p laparoscopic sleeve gastrectomy, EGD          PATIENT NAME: Ashlee Watt     TODAY'S DATE: 1/22/2019, 5:47 AM  CC:  Abdominal pain    SUBJECTIVE:     Pt seen and examined at bedside. No acute events overnight. Tmax 36.8. Pt states overall pain is minimal.  C/O some abdominal pain around the right upper and middle incision sites. Denies fever, vomiting, chest pain, and SOB. Expressed some nausea for which she was given a scopolamine patch. Tolerating diet of NPO. Some Activity, walked around the floor. No flatus, No BM. OBJECTIVE:   VITALS:  BP (!) 152/77   Pulse 77   Temp 98.1 °F (36.7 °C) (Oral)   Resp 16   Ht 5' 3\" (1.6 m)   Wt (!) 364 lb (165.1 kg)   LMP 01/01/2019   SpO2 100%   BMI 64.48 kg/m²      INTAKE/OUTPUT:      Intake/Output Summary (Last 24 hours) at 01/22/19 0547  Last data filed at 01/21/19 1724   Gross per 24 hour   Intake             1375 ml   Output               50 ml   Net             1325 ml       PHYSICAL EXAM:  General Appearance:  awake, alert, oriented, in no acute distress  HEENT:  Normocephalic, atraumatic, mucus membranes moist   No NGT/OGT  Skin:  Skin around hands and legs appeared dry  Lungs:  Normal expansion. Clear to auscultation. No rales, rhonchi, or wheezing. Heart:  Heart sounds are normal.  Regular rate and rhythm without murmur, gallop or rub. Abdomen:   soft, ND, tender in right upper and epigastric area around incision sites, +bowel sounds  Wounds/Incisions: Location:   abdomen. CDI, no drainage, induration, or edema noted. Extremities: Extremities warm to touch, pink, with no edema.       IV Access:  present  No [place]      Data:  CBC:   Lab Results   Component Value Date    WBC 10.3 01/22/2019    RBC 4.31 01/22/2019    HGB 12.0 01/22/2019    HCT 38.7 01/22/2019    MCV 89.8 01/22/2019    MCH 27.8 01/22/2019    MCHC 31.0 01/22/2019    RDW 13.1 01/22/2019     01/22/2019 MPV 9.7 01/22/2019     CMP:    Lab Results   Component Value Date     01/21/2019    K 3.5 01/21/2019     01/21/2019    CO2 23 01/21/2019    BUN 8 01/21/2019    CREATININE 0.81 01/21/2019    GFRAA >60 01/21/2019    LABGLOM >60 01/21/2019    GLUCOSE 161 01/21/2019    PROT 7.8 05/21/2018    LABALBU 3.9 05/21/2018    CALCIUM 9.0 01/21/2019    BILITOT 0.36 05/21/2018    ALKPHOS 86 01/09/2019    AST 55 05/21/2018    ALT 24 01/09/2019       Radiology Review:  Esophagram pending    ASSESSMENT:  Active Hospital Problems    Diagnosis Date Noted    Status post laparoscopic sleeve gastrectomy [Z98.84] 01/21/2019       41 y.o. female with PMH of ZAID,GERD, DM, HLD     1. POD# 1 s/p laparoscopic sleeve gastrectomy     Plan:  1. Diet: NPO  2. Analgesia:  Dilaudid  3. GI prophylaxis: Pepcid, Scopolamine patch  4. DVT prophylaxis:  Lovenox, EPC cuffs  5. Activity:  Continue to encourage ambulation/activity w/ PT/OT  6. Continue to encourage incentive spirometry  7. Continue Wound care  8. Labs/images: CBC, CMP, UGI 1/22    Electronically signed by Ernst Goodpasture  on 1/22/2019 at 5:47 AM       General Surgery Resident Statement/Note:  I have discussed the case, including pertinent history and exam findings with the above medical student have personally seen the patient. Pt seen and examined at bedside. I performed both history and physical exam.     Patient feeling well this AM. OOB and ambulating. Denies nausea/vomiting. Abd soft, nondistended, appropriately TTP; JUVENTINO with serosang output. F/u UGI this AM. Likely d/c today. Note was edited and changes made by me. Assessment and plan reviewed and changes made by me. I agree with the assessment and plan as stated above.     Electronically signed by Compa Dean DO on 1/22/2019 at 6:52 AM

## 2019-01-22 NOTE — OP NOTE
test.  The staple line on the inner lumen of the stomach appeared intact and hemostatic throughout.  The scope was slowly withdrawn to the GE junction and no evidence of stricture noted.  The scope was then withdrawn from the esophagus and no other lesion noted.     Attention was then turned back towards the abdomen the abdomen was aspirated of the prior placed saline for leak test.  I then placed an anchoring suture using 3-0 Vicryl suture to prevent torsion of the sleeve and migration of the sleeve superiorly.  I then placed a 19 [place] Janes drain into the abdomen and positioned this posterior to the stomach and along the staple line.  Specimen was withdrawn from the left upper quadrant incision.  We then irrigated this incision thoroughly with saline.     The drain was noted to be in proper position and again the staple line noted be hemostatic.  Next counts reported to me as correct.  The robot undocked.     The 12 mm port sites were then closed using a suture passer to pass 0 Vicryl suture under direct laparoscopic visualization for proper fascial reapproximation at each port site.  All ports were then removed and the drain secured into position using a 2-0 silk suture.  Pneumoperitoneum was released in entirety.  The skin was then closed using 4-0 Vicryl subcuticular stitches in interrupted fashion.  The region was cleaned with sterile normal saline followed by placement of TinCoBen and Steri-Strips and sterile bandage.  The drain was connected to bulb suction.  The patient tolerated the procedure well and was transferred to PACU.     The patient's family was updated postoperatively.

## 2019-01-22 NOTE — PROGRESS NOTES
Post Op Note    SUBJECTIVE  Pt s/p robotic sleeve gastrectomy. Patient reports she is doing well overall. Minimal left sided abdominal pain. No flatus, BM yet. No other complaints     OBJECTIVE  VITALS:  BP (!) 151/69   Pulse 95   Temp 97.9 °F (36.6 °C) (Oral)   Resp 16   Ht 5' 3\" (1.6 m)   Wt (!) 364 lb (165.1 kg)   LMP 01/01/2019   SpO2 93%   BMI 64.48 kg/m²         GENERAL:  awake and alert. No acute distress  CARDIOVASCULAR:  regular rate and rhythm   LUNGS:  CTA Bilaterally  ABDOMEN:   Abdomen soft, non-tender, non-distended  INCISION: Incisions clean/dry/intact, drain in place RUQ, minimal serosang output    ASSESSMENT  1. POD# 0 s/p robotic sleeve gastrectomy    PLAN  1. Pain control - dilaudid  2. Nausea control - phenergan  3. IVF  4.  UGI 1/22    Loretta Dogde MD  General Surgery PGY1  Pager Number: [phone]

## 2019-01-23 LAB — SURGICAL PATHOLOGY REPORT: NORMAL

## 2019-01-24 ENCOUNTER — TELEPHONE (OUTPATIENT)
Dept: BARIATRICS/WEIGHT MGMT | Age: 44
End: 2019-01-24

## 2019-01-24 NOTE — DISCHARGE SUMMARY
BARIATRIC SURGERY DISCHARGE SUMMARY    Disposition: DISCHARGE TO HOME    PATIENT NAME: Angie Sullivan    YOB: 1975  MEDICAL RECORD NO. [id number]  DATE: 1/24/2019  ADMITTING PHYSICIAN: Dr. Peres Sensor: Giovana Coffey, APRN - CNP  DISCHARGE DATE:  1/22/2019  4:44 PM  DISPOSITION: to Home  ADMITTING DIAGNOSIS:   1. Status post laparoscopic sleeve gastrectomy      DISCHARGE DIAGNOSIS:   Patient Active Problem List   Diagnosis Code    Neurocardiogenic syncope R55    Diabetes mellitus type 2 in obese (HCC) E11.69, E66.9    Anemia D64.9    Numbness and tingling in both hands R20.0, R20.2    Morbid obesity with BMI of 60.0-69.9, adult (HCC) E66.01, Z68.44    Hip pain M25.559    Vitamin D deficiency E55.9    Gallbladder calculus without cholecystitis K80.20    ZAID (obstructive sleep apnea) G47.33    Chronic pain of left knee M25.562, G89.29    Status post laparoscopic sleeve gastrectomy Z98.84     CONSULTANTS:  none    PROCEDURES / DIAGNOSTIC TESTS:    1. Robotic assisted laparoscopic sleeve gastrectomy   2. EGD    Romy Dixon is a 42 yo F w/ longstanding h/o obesity who underwent laparoscopic sleeve gastrectomy - robotic assisted on 1/21/19. Pt was admitted post-operatively for further evaluation and mgmt. Labs and images were followed daily throughout her admission. Post-operatively pt's pain was controlled w/ IV pain medications and transitioned to PO as tolerated. Esophogram completed POD#1 and was w/o evidence of gastric leak. Bariatric FLD started and continued on DC. Pt was ambulatory w/o assistance. At time of discharge, Angie Sullivan was tolerating a bariatric liquid diet, ambulating on her own accord, had adequate analgesia on oral pain medications, and had no signs of symptoms of complications. Patient was deemed medically stable and was DC to home on 1/22/2019  4:44 PM w/ instructions to f/u in office in 1-2 weeks.   Pt expressed understanding of and agreement w/ DC instructions. DISCHARGE INSTRUCTIONS     Discharge Medications:        Medication List      START taking these medications    enoxaparin 60 MG/0.6ML injection  Commonly known as:  LOVENOX  Inject 0.6 mLs into the skin 2 times daily for 14 days     promethazine 25 MG tablet  Commonly known as:  PHENERGAN  Take 1 tablet by mouth every 6 hours as needed for Nausea        CHANGE how you take these medications    oxyCODONE-acetaminophen 5-325 MG per tablet  Commonly known as:  PERCOCET  Take 1 tablet by mouth every 6 hours as needed for Pain for up to 28 doses. Randell Costello Date: 1/21/19  What changed:  when to take this        STOP taking these medications    calcium carbonate 500 MG chewable tablet  Commonly known as:  TUMS     HYDROcodone-acetaminophen 5-325 MG per tablet  Commonly known as:  NORCO     metFORMIN 500 MG tablet  Commonly known as:  GLUCOPHAGE     therapeutic multivitamin-minerals tablet           Where to Get Your Medications      You can get these medications from any pharmacy    Bring a paper prescription for each of these medications  · enoxaparin 60 MG/0.6ML injection  · oxyCODONE-acetaminophen 5-325 MG per tablet  · promethazine 25 MG tablet       Diet:   diet as tolerated  Activity: - Avoid strenuous activity or exercise until cleared during follow-up appointment  - No driving or operating heavy machinery while taking narcotics   Wound Care: Daily and as needed  Follow-up:   1. Dr. Tori Pereyra in 1-2 weeks  2.  Follow up in  the next few weeks with PCP: XOCHITL Desai CNP, Trevor Peru  1/24/2019, 12:44 PM

## 2019-01-25 ENCOUNTER — TELEPHONE (OUTPATIENT)
Dept: BARIATRICS/WEIGHT MGMT | Age: 44
End: 2019-01-25

## 2019-01-31 ENCOUNTER — OFFICE VISIT (OUTPATIENT)
Dept: BARIATRICS/WEIGHT MGMT | Age: 44
End: 2019-01-31

## 2019-01-31 VITALS
BODY MASS INDEX: 51.91 KG/M2 | HEART RATE: 72 BPM | HEIGHT: 63 IN | DIASTOLIC BLOOD PRESSURE: 74 MMHG | SYSTOLIC BLOOD PRESSURE: 114 MMHG | WEIGHT: 293 LBS | TEMPERATURE: 98.4 F | RESPIRATION RATE: 20 BRPM

## 2019-01-31 DIAGNOSIS — Z98.84 STATUS POST BARIATRIC SURGERY: Primary | ICD-10-CM

## 2019-01-31 PROCEDURE — 99024 POSTOP FOLLOW-UP VISIT: CPT | Performed by: SURGERY

## 2019-01-31 RX ORDER — OXYCODONE HYDROCHLORIDE AND ACETAMINOPHEN 5; 325 MG/1; MG/1
1 TABLET ORAL NIGHTLY PRN
COMMUNITY
End: 2020-06-18

## 2019-01-31 RX ORDER — M-VIT,TX,IRON,MINS/CALC/FOLIC 27MG-0.4MG
1 TABLET ORAL DAILY
COMMUNITY

## 2019-01-31 RX ORDER — CALCIUM CARBONATE 200(500)MG
1 TABLET,CHEWABLE ORAL DAILY
COMMUNITY
End: 2019-07-24 | Stop reason: CLARIF

## 2019-01-31 RX ORDER — URSODIOL 300 MG/1
300 CAPSULE ORAL 2 TIMES DAILY
Qty: 180 CAPSULE | Refills: 0 | Status: SHIPPED | OUTPATIENT
Start: 2019-01-31 | End: 2019-07-24 | Stop reason: ALTCHOICE

## 2019-02-13 ENCOUNTER — TELEPHONE (OUTPATIENT)
Dept: BARIATRICS/WEIGHT MGMT | Age: 44
End: 2019-02-13

## 2019-02-18 ENCOUNTER — OFFICE VISIT (OUTPATIENT)
Dept: ORTHOPEDIC SURGERY | Age: 44
End: 2019-02-18
Payer: MEDICARE

## 2019-02-18 VITALS — BODY MASS INDEX: 51.91 KG/M2 | HEIGHT: 63 IN | WEIGHT: 293 LBS

## 2019-02-18 DIAGNOSIS — M16.0 BILATERAL PRIMARY OSTEOARTHRITIS OF HIP: Primary | ICD-10-CM

## 2019-02-18 PROCEDURE — 99213 OFFICE O/P EST LOW 20 MIN: CPT | Performed by: ORTHOPAEDIC SURGERY

## 2019-02-18 PROCEDURE — G8417 CALC BMI ABV UP PARAM F/U: HCPCS | Performed by: ORTHOPAEDIC SURGERY

## 2019-02-18 PROCEDURE — G8427 DOCREV CUR MEDS BY ELIG CLIN: HCPCS | Performed by: ORTHOPAEDIC SURGERY

## 2019-02-18 PROCEDURE — G8484 FLU IMMUNIZE NO ADMIN: HCPCS | Performed by: ORTHOPAEDIC SURGERY

## 2019-02-18 PROCEDURE — 1036F TOBACCO NON-USER: CPT | Performed by: ORTHOPAEDIC SURGERY

## 2019-02-18 RX ORDER — MELOXICAM 7.5 MG
7.5 TABLET ORAL DAILY
COMMUNITY
End: 2020-05-28 | Stop reason: ALTCHOICE

## 2019-02-20 ENCOUNTER — TELEPHONE (OUTPATIENT)
Dept: ORTHOPEDIC SURGERY | Age: 44
End: 2019-02-20

## 2019-02-20 DIAGNOSIS — M16.0 BILATERAL PRIMARY OSTEOARTHRITIS OF HIP: Primary | ICD-10-CM

## 2019-02-28 ENCOUNTER — OFFICE VISIT (OUTPATIENT)
Dept: BARIATRICS/WEIGHT MGMT | Age: 44
End: 2019-02-28

## 2019-02-28 VITALS
SYSTOLIC BLOOD PRESSURE: 122 MMHG | WEIGHT: 293 LBS | RESPIRATION RATE: 18 BRPM | HEIGHT: 63 IN | BODY MASS INDEX: 51.91 KG/M2 | DIASTOLIC BLOOD PRESSURE: 70 MMHG | HEART RATE: 74 BPM

## 2019-02-28 DIAGNOSIS — Z98.84 STATUS POST BARIATRIC SURGERY: Primary | ICD-10-CM

## 2019-02-28 PROCEDURE — 99024 POSTOP FOLLOW-UP VISIT: CPT | Performed by: SURGERY

## 2019-03-29 ENCOUNTER — TELEPHONE (OUTPATIENT)
Dept: PAIN MANAGEMENT | Age: 44
End: 2019-03-29

## 2019-04-29 ENCOUNTER — OFFICE VISIT (OUTPATIENT)
Dept: BARIATRICS/WEIGHT MGMT | Age: 44
End: 2019-04-29
Payer: MEDICARE

## 2019-04-29 VITALS
DIASTOLIC BLOOD PRESSURE: 84 MMHG | HEIGHT: 63 IN | RESPIRATION RATE: 20 BRPM | WEIGHT: 293 LBS | BODY MASS INDEX: 51.91 KG/M2 | HEART RATE: 80 BPM | SYSTOLIC BLOOD PRESSURE: 132 MMHG

## 2019-04-29 DIAGNOSIS — M25.551 PAIN OF BOTH HIP JOINTS: ICD-10-CM

## 2019-04-29 DIAGNOSIS — G47.33 OSA (OBSTRUCTIVE SLEEP APNEA): Primary | ICD-10-CM

## 2019-04-29 DIAGNOSIS — M25.552 PAIN OF BOTH HIP JOINTS: ICD-10-CM

## 2019-04-29 DIAGNOSIS — M25.562 CHRONIC PAIN OF LEFT KNEE: ICD-10-CM

## 2019-04-29 DIAGNOSIS — G89.29 CHRONIC PAIN OF LEFT KNEE: ICD-10-CM

## 2019-04-29 DIAGNOSIS — E66.01 MORBID OBESITY WITH BMI OF 50.0-59.9, ADULT (HCC): ICD-10-CM

## 2019-04-29 DIAGNOSIS — R55 NEUROCARDIOGENIC SYNCOPE: ICD-10-CM

## 2019-04-29 DIAGNOSIS — Z98.84 STATUS POST LAPAROSCOPIC SLEEVE GASTRECTOMY: ICD-10-CM

## 2019-04-29 PROCEDURE — 99213 OFFICE O/P EST LOW 20 MIN: CPT | Performed by: NURSE PRACTITIONER

## 2019-04-29 PROCEDURE — 1036F TOBACCO NON-USER: CPT | Performed by: NURSE PRACTITIONER

## 2019-04-29 PROCEDURE — G8427 DOCREV CUR MEDS BY ELIG CLIN: HCPCS | Performed by: NURSE PRACTITIONER

## 2019-04-29 PROCEDURE — G8417 CALC BMI ABV UP PARAM F/U: HCPCS | Performed by: NURSE PRACTITIONER

## 2019-04-29 NOTE — PROGRESS NOTES
Post-op Bariatric Surgery Note    Subjective     Patient is 3 months s/p laparoscopic sleeve gastrectomy, down 44 lbs. Overall, doing well. Incisions well healed. Consistent use of MVI and calcium. Physical activity includes walking. Has not had labs that were ordered 2/28/19  drawn as of yet. No pain or other specific problems or concerns. Allergies: Allergies   Allergen Reactions    Motrin [Ibuprofen]      Pt states that it causes her stomach to bleed    Shellfish-Derived Products Hives and Itching       Past Medical History:     Past Medical History:   Diagnosis Date    Anemia 9/18/2014    Chronic back pain     Diabetes mellitus type 2 in obese ([address]     On Metformin    GERD (gastroesophageal reflux disease)     Hip pain, chronic     Bilateral, bilat surgery as child    Hyperlipidemia 1/20/2015    Morbid obesity [address] [address]     ZAID (obstructive sleep apnea)     CPAP nightly    Osteoarthritis     Syncope     Trichomonas contact, treated     Urinary incontinence    .     Past Surgical History:  Past Surgical History:   Procedure Laterality Date    DENTAL SURGERY  01/15/2019    with local at dental office    HIP SURGERY Bilateral     at age13 with pins/rods Bilateral    SLEEVE GASTRECTOMY  01/21/2019     ROBOTIC GASTRECTOMY SLEEVE LAPAROSCOPIC, LIVER BIOPSY, EGD    SLEEVE GASTRECTOMY N/A 1/21/2019    XI ROBOTIC GASTRECTOMY SLEEVE LAPAROSCOPIC, EGD performed by Joo Mayfield DO [facility]  2018       Family History:  Family History   Problem Relation Age of Onset    High Blood Pressure Father     Diabetes Mother     Hypertension Mother    [facility] Arthritis Mother     Depression Mother     High Cholesterol Mother     Kidney Disease Mother         soon to have dislysis    Diabetes Maternal Uncle     Diabetes Maternal Aunt     High Blood Pressure Maternal Aunt     High Cholesterol Brother     Schizophrenia Brother     No Known Problems Maternal Narrative    Not on file       Current Medications:  Current Outpatient Medications   Medication Sig Dispense Refill    meloxicam (MOBIC) 7.5 MG tablet Take 7.5 mg by mouth daily      Multiple Vitamins-Minerals (THERAPEUTIC MULTIVITAMIN-MINERALS) tablet Take 1 tablet by mouth daily      calcium carbonate (TUMS) 500 MG chewable tablet Take 1 tablet by mouth daily      oxyCODONE-acetaminophen (PERCOCET) 5-325 MG per tablet Take 1 tablet by mouth nightly as needed for Pain. Allayne Radha  ursodiol (ACTIGALL) 300 MG capsule Take 1 capsule by mouth 2 times daily 180 capsule 0    promethazine (PHENERGAN) 25 MG tablet Take 1 tablet by mouth every 6 hours as needed for Nausea 30 tablet 0     No current facility-administered medications for this visit. Vital Signs:  /84 (Site: Right Lower Arm, Position: Sitting, Cuff Size: Large Adult)   Pulse 80   Resp 20   Ht 5' 3\" (1.6 m)   Wt (!) 320 lb (145.2 kg)   LMP 04/26/2019 (Exact Date)   BMI 56.69 kg/m²     BMI/Height/Weight:  Body mass index is 56.69 kg/m². Review of Systems - A review of systems was performed. All was negative unless otherwise documented in HPI. Constitutional: Negative for fever, chills and diaphoresis. HENT: Negative for hearing loss and trouble swallowing. Eyes: Negative for photophobia and visual disturbance. Respiratory: Negative for cough, shortness of breath and wheezing. Cardiovascular: Negative for chest pain and palpitations. Gastrointestinal: Negative for nausea, vomiting, abdominal pain, diarrhea, constipation, blood in stool and abdominal distention. Endocrine: Negative for polydipsia, polyphagia and polyuria. Genitourinary: Negative for dysuria, frequency, hematuria and difficulty urinating. Musculoskeletal: Negative for myalgias, joint swelling. Skin: Negative for pallor and rash. Neurological: Negative for dizziness, tremors, light-headedness and headaches.    Psychiatric/Behavioral: Negative for sleep disturbance and dysphoric mood. Objective:      Physical Exam   Vital signs reviewed. General: Well-developed and well-nourished. No acute distress. Skin: Warm, dry and intact. HEENT: Normocephalic. EOMs intact. Conjunctivae normal. Neck supple. Cardiovascular: Normal rate, regular rhythm. Pulmonary/Chest: Normal effort. Lungs clear to auscultation. No rales, rhonchi or wheezing. Abdominal: Positive bowel sounds. Soft, nontender. Nondistended. No rigidity, rebound, or guarding. Musculoskeletal: Movement x4. No edema. Neurological: Gait normal. Alert and oriented to person, place, and time. Psychiatric: Normal mood and affect. Speech and behavior normal. Judgment and thought content normal. Cognition and memory intact. Assessment:       Diagnosis Orders   1. ZAID (obstructive sleep apnea)     2. Chronic pain of left knee     3. Status post laparoscopic sleeve gastrectomy     4. Neurocardiogenic syncope     5. Morbid obesity with BMI of 50.0-59.9, adult (HCC)     6. Pain of both hip joints         Plan:    Dietitian visit today. Patient to continue to increase protein to obtain 60-80g/day, at least 48-64oz of fluid daily, and gradually increase exercise regimen. Patient encouraged to have labs drawn. Orders reprinted for her. Follow-up  Return in about 3 months (around 7/29/2019). Orders this encounter:  No orders of the defined types were placed in this encounter. Prescriptions this encounter:  No orders of the defined types were placed in this encounter.       Electronically signed by:  Shea Wise CNP

## 2019-04-29 NOTE — PROGRESS NOTES
Medical Nutrition Therapy  Metabolic and Bariatric surgery  3 month follow up        Pt reports:         Vitals:   Vitals:    04/29/19 1416   BP: 132/84   Site: Right Lower Arm   Position: Sitting   Cuff Size: Large Adult   Pulse: 80   Resp: 20   Weight: (!) 320 lb (145.2 kg)   Height: 5' 3\" (1.6 m)      Body mass index is 56.69 kg/m². Labs reviewed:     Multivitamin/mineral intake:3 BA Ultramulticomplete with iron capsule daily  Calcium intake:   2 Tums daily   Other:            Nutrition Assessment:   PES: Inadequate food and beverage intake r/t WLS as evidenced by loss of excess body weight lost 15 lbs over 2 months. Goals   60-80gm of protein  48-64oz of fluid  Vitamin adherance  Basic adherance to WLS behavious and this document has been scanned into the chart.        [x] met     []  Not met        Plan:   F/u 6 months after surgery         Gregory Goodman

## 2019-07-24 ENCOUNTER — OFFICE VISIT (OUTPATIENT)
Dept: BARIATRICS/WEIGHT MGMT | Age: 44
End: 2019-07-24
Payer: MEDICARE

## 2019-07-24 VITALS
RESPIRATION RATE: 18 BRPM | HEART RATE: 78 BPM | SYSTOLIC BLOOD PRESSURE: 110 MMHG | WEIGHT: 289 LBS | DIASTOLIC BLOOD PRESSURE: 70 MMHG | BODY MASS INDEX: 51.21 KG/M2 | HEIGHT: 63 IN

## 2019-07-24 DIAGNOSIS — R55 NEUROCARDIOGENIC SYNCOPE: ICD-10-CM

## 2019-07-24 DIAGNOSIS — E66.01 MORBID OBESITY WITH BMI OF 50.0-59.9, ADULT (HCC): ICD-10-CM

## 2019-07-24 DIAGNOSIS — Z98.84 STATUS POST LAPAROSCOPIC SLEEVE GASTRECTOMY: ICD-10-CM

## 2019-07-24 DIAGNOSIS — M25.559 ARTHRALGIA OF HIP, UNSPECIFIED LATERALITY: Primary | ICD-10-CM

## 2019-07-24 PROCEDURE — G8417 CALC BMI ABV UP PARAM F/U: HCPCS | Performed by: NURSE PRACTITIONER

## 2019-07-24 PROCEDURE — 1036F TOBACCO NON-USER: CPT | Performed by: NURSE PRACTITIONER

## 2019-07-24 PROCEDURE — G8427 DOCREV CUR MEDS BY ELIG CLIN: HCPCS | Performed by: NURSE PRACTITIONER

## 2019-07-24 PROCEDURE — 99213 OFFICE O/P EST LOW 20 MIN: CPT | Performed by: NURSE PRACTITIONER

## 2019-07-24 NOTE — PROGRESS NOTES
 High Blood Pressure Maternal Grandfather     Diabetes Paternal Grandmother     Heart Failure Paternal Grandmother     Heart Failure Paternal Uncle     Diabetes Paternal Aunt     High Blood Pressure Paternal Aunt     Kidney Disease Paternal Aunt         Dialysis    Diabetes Maternal Uncle     Other Daughter         Hip problem    Diabetes Daughter         on Metformin       Social History:  Social History     Socioeconomic History    Marital status:      Spouse name: Summer Thomas Number of children: 10    Years of education: Not on file    Highest education level: Not on file   Occupational History    Occupation: RN     Comment: [employer] care [facility]   Social Needs    Financial resource strain: Not on file    Food insecurity:     Worry: Not on file     Inability: Not on file   [employer] needs:     Medical: Not on file     Non-medical: Not on file   Tobacco Use    Smoking status: Former Smoker     Packs/day: 1.00     Years: 5.00     Pack years: 5.00     Types: Cigars     Start date:      Last attempt to quit: 2013     Years since quittin.9    Smokeless tobacco: Never Used   Substance and Sexual Activity    Alcohol use: No    Drug use: No    Sexual activity: Yes     Partners: Male   Lifestyle    Physical activity:     Days per week: Not on file     Minutes per session: Not on file    Stress: Not on file   Relationships    Social connections:     Talks on phone: Not on file     Gets together: Not on file     Attends Rastafarian service: Not on file     Active member of club or organization: Not on file     Attends meetings of clubs or organizations: Not on file     Relationship status: Not on file    Intimate partner violence:     Fear of current or ex partner: Not on file     Emotionally abused: Not on file     Physically abused: Not on file     Forced sexual activity: Not on file   Other Topics Concern    Not on file   Social History Narrative    Not

## 2019-08-27 ENCOUNTER — TELEPHONE (OUTPATIENT)
Dept: PRIMARY CARE CLINIC | Age: 44
End: 2019-08-27

## 2019-09-10 DIAGNOSIS — M16.0 BILATERAL PRIMARY OSTEOARTHRITIS OF HIP: Primary | ICD-10-CM

## 2019-09-18 ENCOUNTER — OFFICE VISIT (OUTPATIENT)
Dept: ORTHOPEDIC SURGERY | Age: 44
End: 2019-09-18
Payer: MEDICARE

## 2019-09-18 VITALS — WEIGHT: 284 LBS | HEIGHT: 63 IN | BODY MASS INDEX: 50.32 KG/M2

## 2019-09-18 DIAGNOSIS — M16.0 BILATERAL PRIMARY OSTEOARTHRITIS OF HIP: Primary | ICD-10-CM

## 2019-09-18 PROCEDURE — G8417 CALC BMI ABV UP PARAM F/U: HCPCS | Performed by: STUDENT IN AN ORGANIZED HEALTH CARE EDUCATION/TRAINING PROGRAM

## 2019-09-18 PROCEDURE — G8427 DOCREV CUR MEDS BY ELIG CLIN: HCPCS | Performed by: STUDENT IN AN ORGANIZED HEALTH CARE EDUCATION/TRAINING PROGRAM

## 2019-09-18 PROCEDURE — 99213 OFFICE O/P EST LOW 20 MIN: CPT | Performed by: STUDENT IN AN ORGANIZED HEALTH CARE EDUCATION/TRAINING PROGRAM

## 2019-09-18 PROCEDURE — 1036F TOBACCO NON-USER: CPT | Performed by: STUDENT IN AN ORGANIZED HEALTH CARE EDUCATION/TRAINING PROGRAM

## 2019-09-18 NOTE — PROGRESS NOTES
and plantar nerve distribution SILT. Dorsalis pedis/posterior tibial pulses 2+ with BCR. Significant deficit of internal/external rotation of the hip joint. LLE-No ecchymoses, abrasions, deformity, or lacerations. Tender to palpation over anterior and lateral hip. Compartments soft. EHL/FHL/TA/GS complex motor intact. Sural, saphenous, superificial/deep peroneal, and plantar nerve distribution SILT. Dorsalis pedis/posterior tibial pulses 2+ with BCR. Significant restriction of internal/external rotation of hip joint. CV: no obvious JVD, no dependent edema, distal pulses 2+  Respiratory: chest rise symmetric, unlabored respirations, no audible wheezing  Skin: warm, well perfused, no obvious rashes or lesions  Psych: Patient displays understanding of exam, diagnosis, and plan. Radiology:   History:   Bilateral hip pain    Comparison:   8/22/2018    Findings:   3 xray views of the pelvis demonstrate significant bilateral hip joint space narrowing and osteophytosis. Hardware in place within bilateral femoral heads from prior slipped capital femoral epiphysis surgery. No acute fractures, dislocations, subluxations noted on imaging. Impression:  Bilateral hip degenerative joint disease    Assessment:      1. Bilateral primary osteoarthritis of hip         Plan:      We discussed with the patient that we would like for her to get evaluated from a higher care facility. We feel this is necessary given her history of prior surgery and current associated comorbidities. We will work on beginning to receive the referral and will contact the patient appropriately. We feel that she will need surgical treatment of her hip pain to definitively treat her symptoms. She may follow up with us PRN. Follow up:Return if symptoms worsen or fail to improve. No orders of the defined types were placed in this encounter.          Orders Placed This Encounter   Procedures    External Referral To Orthopedic Surgery

## 2019-10-21 ENCOUNTER — OFFICE VISIT (OUTPATIENT)
Dept: BARIATRICS/WEIGHT MGMT | Age: 44
End: 2019-10-21
Payer: MEDICARE

## 2019-10-21 VITALS
HEIGHT: 63 IN | HEART RATE: 70 BPM | DIASTOLIC BLOOD PRESSURE: 74 MMHG | SYSTOLIC BLOOD PRESSURE: 118 MMHG | RESPIRATION RATE: 18 BRPM | BODY MASS INDEX: 47.66 KG/M2 | WEIGHT: 269 LBS

## 2019-10-21 DIAGNOSIS — E66.01 OBESITY, CLASS III, BMI 40-49.9 (MORBID OBESITY) (HCC): ICD-10-CM

## 2019-10-21 DIAGNOSIS — G89.29 CHRONIC PAIN OF LEFT KNEE: ICD-10-CM

## 2019-10-21 DIAGNOSIS — Z98.84 STATUS POST LAPAROSCOPIC SLEEVE GASTRECTOMY: ICD-10-CM

## 2019-10-21 DIAGNOSIS — R55 NEUROCARDIOGENIC SYNCOPE: Primary | ICD-10-CM

## 2019-10-21 DIAGNOSIS — M25.562 CHRONIC PAIN OF LEFT KNEE: ICD-10-CM

## 2019-10-21 PROBLEM — E66.813 OBESITY, CLASS III, BMI 40-49.9 (MORBID OBESITY) (HCC): Status: ACTIVE | Noted: 2019-10-21

## 2019-10-21 PROCEDURE — G8427 DOCREV CUR MEDS BY ELIG CLIN: HCPCS | Performed by: NURSE PRACTITIONER

## 2019-10-21 PROCEDURE — 99213 OFFICE O/P EST LOW 20 MIN: CPT | Performed by: NURSE PRACTITIONER

## 2019-10-21 PROCEDURE — G8417 CALC BMI ABV UP PARAM F/U: HCPCS | Performed by: NURSE PRACTITIONER

## 2019-10-21 PROCEDURE — G8484 FLU IMMUNIZE NO ADMIN: HCPCS | Performed by: NURSE PRACTITIONER

## 2019-10-21 PROCEDURE — 1036F TOBACCO NON-USER: CPT | Performed by: NURSE PRACTITIONER

## 2020-05-28 ENCOUNTER — VIRTUAL VISIT (OUTPATIENT)
Dept: INTERNAL MEDICINE | Age: 45
End: 2020-05-28
Payer: MEDICARE

## 2020-05-28 VITALS — BODY MASS INDEX: 46.72 KG/M2 | WEIGHT: 263.7 LBS

## 2020-05-28 PROCEDURE — 1036F TOBACCO NON-USER: CPT | Performed by: NURSE PRACTITIONER

## 2020-05-28 PROCEDURE — 99213 OFFICE O/P EST LOW 20 MIN: CPT | Performed by: NURSE PRACTITIONER

## 2020-05-28 PROCEDURE — G8427 DOCREV CUR MEDS BY ELIG CLIN: HCPCS | Performed by: NURSE PRACTITIONER

## 2020-05-28 PROCEDURE — G8417 CALC BMI ABV UP PARAM F/U: HCPCS | Performed by: NURSE PRACTITIONER

## 2020-05-28 RX ORDER — NITROFURANTOIN 25; 75 MG/1; MG/1
100 CAPSULE ORAL 2 TIMES DAILY
Qty: 14 CAPSULE | Refills: 0 | Status: SHIPPED | OUTPATIENT
Start: 2020-05-28 | End: 2020-06-04

## 2020-05-28 ASSESSMENT — ENCOUNTER SYMPTOMS
ABDOMINAL PAIN: 0
CHEST TIGHTNESS: 0
NAUSEA: 0
VOMITING: 0
DIARRHEA: 0

## 2020-05-28 ASSESSMENT — PATIENT HEALTH QUESTIONNAIRE - PHQ9
SUM OF ALL RESPONSES TO PHQ9 QUESTIONS 1 & 2: 2
2. FEELING DOWN, DEPRESSED OR HOPELESS: 1
SUM OF ALL RESPONSES TO PHQ QUESTIONS 1-9: 2
SUM OF ALL RESPONSES TO PHQ QUESTIONS 1-9: 2
1. LITTLE INTEREST OR PLEASURE IN DOING THINGS: 1

## 2020-05-28 NOTE — PROGRESS NOTES
Bilateral, bilat surgery as child    Hyperlipidemia 2015    Morbid obesity ([address]     ZAID (obstructive sleep apnea)     CPAP nightly    Osteoarthritis     Syncope     Trichomonas contact, treated     Urinary incontinence    ,   Past Surgical History:   Procedure Laterality Date    DENTAL SURGERY  01/15/2019    with local at dental office   [address] [place] [address] Bilateral     at age13 with pins/rods Bilateral    SLEEVE GASTRECTOMY  2019     ROBOTIC GASTRECTOMY SLEEVE LAPAROSCOPIC, LIVER BIOPSY, EGD    SLEEVE GASTRECTOMY N/A 2019    XI ROBOTIC GASTRECTOMY SLEEVE LAPAROSCOPIC, EGD performed by Berna Pearce DO at [facility]  2018   ,   Social History     Tobacco Use    Smoking status: Former Smoker     Packs/day: 1.00     Years: 5.00     Pack years: 5.00     Types: Cigars     Start date:      Last attempt to quit: 2013     Years since quittin.8    Smokeless tobacco: Never Used   Substance Use Topics    Alcohol use: No    Drug use: No         RECORD REVIEW: Previous medical records were reviewed at today's visit. PHYSICAL EXAMINATION:    Vital Signs: (As obtained by patient/caregiver at home)  There were no vitals filed for this visit. Physical Exam  Constitutional:       Appearance: Normal appearance. She is obese. She is not toxic-appearing. HENT:      Head: Normocephalic. Right Ear: External ear normal.      Left Ear: External ear normal.      Nose: Nose normal.      Mouth/Throat:      Mouth: Mucous membranes are moist.   Eyes:      General: No scleral icterus. Right eye: No discharge. Left eye: No discharge. Conjunctiva/sclera: Conjunctivae normal.   Neck:      Musculoskeletal: Normal range of motion. Pulmonary:      Effort: Pulmonary effort is normal.   Skin:     Coloration: Skin is not jaundiced. Neurological:      Mental Status: She is alert and oriented to person, place, and time.    Psychiatric: Mood and Affect: Mood normal.         Behavior: Behavior normal.         Thought Content: Thought content normal.               RECORD REVIEW: Previous medical records were reviewed at today's visit. The past family, medical and social histories were reviewed and unchanged with the exceptions of what is mentioned in this note. Due to this being a TeleHealth encounter, evaluation of the following organ systems is limited: Vitals/Constitutional/EENT/Resp/CV/GI//MS/Neuro/Skin/Heme-Lymph-Imm. ASSESSMENT/PLAN:  Diagnoses and all orders for this visit:    Acute cystitis without hematuria    Mixed incontinence urge and stress (male)(female)  -     External Referral To Physical Therapy    Other orders  -     nitrofurantoin, macrocrystal-monohydrate, (MACROBID) 100 MG capsule; Take 1 capsule by mouth 2 times daily for 7 days    Symptoms consistent with UTI. Given current pandemic situation, unable to perform point-of-care urine testing in office. Agreed to treat with Macrobid based on patient's symptoms.  -Patient complains of chronic urgency and incontinence as well, likely related to obesity and multiparous history. I discussed pelvic floor therapy, and she is in agreement. Return if symptoms worsen or fail to improve. An  electronic signature was used to authenticate this note. --XOCHITL Gamboa - CNP on 5/28/2020 at 10:56 AM    9}    Pursuant to the emergency declaration under the [facility] waiver authority and the [employer] Act, this Virtual  Visit was conducted, with patient's consent, to reduce the patient's risk of exposure to COVID-19 and provide continuity of care for an established patient. Services were provided through a video synchronous discussion virtually to substitute for in-person clinic visit.

## 2020-06-18 ENCOUNTER — OFFICE VISIT (OUTPATIENT)
Dept: BARIATRICS/WEIGHT MGMT | Age: 45
End: 2020-06-18
Payer: MEDICARE

## 2020-06-18 VITALS
SYSTOLIC BLOOD PRESSURE: 110 MMHG | DIASTOLIC BLOOD PRESSURE: 70 MMHG | BODY MASS INDEX: 48.37 KG/M2 | HEART RATE: 64 BPM | TEMPERATURE: 97.3 F | HEIGHT: 63 IN | WEIGHT: 273 LBS

## 2020-06-18 PROCEDURE — 99213 OFFICE O/P EST LOW 20 MIN: CPT | Performed by: SURGERY

## 2020-06-18 PROCEDURE — 3046F HEMOGLOBIN A1C LEVEL >9.0%: CPT | Performed by: SURGERY

## 2020-06-18 PROCEDURE — G8427 DOCREV CUR MEDS BY ELIG CLIN: HCPCS | Performed by: SURGERY

## 2020-06-18 PROCEDURE — 2022F DILAT RTA XM EVC RTNOPTHY: CPT | Performed by: SURGERY

## 2020-06-18 PROCEDURE — 1036F TOBACCO NON-USER: CPT | Performed by: SURGERY

## 2020-06-18 PROCEDURE — G8417 CALC BMI ABV UP PARAM F/U: HCPCS | Performed by: SURGERY

## 2020-06-18 RX ORDER — FAMOTIDINE 20 MG/1
20 TABLET, FILM COATED ORAL 2 TIMES DAILY
Qty: 60 TABLET | Refills: 3 | Status: SHIPPED | OUTPATIENT
Start: 2020-06-18 | End: 2021-06-23 | Stop reason: SDUPTHER

## 2020-06-18 NOTE — Clinical Note
She is doing well 18 months after sleeve gastrectomy and down 119 lbs. We will see her back in 6 months.   Thanks  Neisha Bradley

## 2021-06-23 ENCOUNTER — OFFICE VISIT (OUTPATIENT)
Dept: BARIATRICS/WEIGHT MGMT | Age: 46
End: 2021-06-23
Payer: MEDICARE

## 2021-06-23 VITALS
BODY MASS INDEX: 49.61 KG/M2 | WEIGHT: 280 LBS | DIASTOLIC BLOOD PRESSURE: 72 MMHG | HEART RATE: 80 BPM | RESPIRATION RATE: 20 BRPM | HEIGHT: 63 IN | SYSTOLIC BLOOD PRESSURE: 128 MMHG

## 2021-06-23 DIAGNOSIS — E66.01 MORBID OBESITY WITH BMI OF 45.0-49.9, ADULT (HCC): ICD-10-CM

## 2021-06-23 DIAGNOSIS — K21.00 GASTROESOPHAGEAL REFLUX DISEASE WITH ESOPHAGITIS WITHOUT HEMORRHAGE: Primary | ICD-10-CM

## 2021-06-23 DIAGNOSIS — Z98.84 STATUS POST LAPAROSCOPIC SLEEVE GASTRECTOMY: ICD-10-CM

## 2021-06-23 PROCEDURE — 1036F TOBACCO NON-USER: CPT | Performed by: SURGERY

## 2021-06-23 PROCEDURE — G8417 CALC BMI ABV UP PARAM F/U: HCPCS | Performed by: SURGERY

## 2021-06-23 PROCEDURE — G8427 DOCREV CUR MEDS BY ELIG CLIN: HCPCS | Performed by: SURGERY

## 2021-06-23 PROCEDURE — 99213 OFFICE O/P EST LOW 20 MIN: CPT | Performed by: SURGERY

## 2021-06-23 RX ORDER — PHENTERMINE HYDROCHLORIDE 37.5 MG/1
37.5 TABLET ORAL
Qty: 30 TABLET | Refills: 0 | Status: SHIPPED | OUTPATIENT
Start: 2021-06-23 | End: 2021-07-23

## 2021-06-23 RX ORDER — FAMOTIDINE 20 MG/1
20 TABLET, FILM COATED ORAL 2 TIMES DAILY
Qty: 60 TABLET | Refills: 3 | Status: SHIPPED | OUTPATIENT
Start: 2021-06-23 | End: 2022-07-28

## 2021-06-23 NOTE — PROGRESS NOTES
Medical Nutrition Therapy  Metabolic and Bariatric surgery  Annual follow up note     2.5year    Pt reports:         Vitals:   Vitals:    06/23/21 0943   BP: 128/72   Site: Right Upper Arm   Position: Sitting   Cuff Size: Large Adult   Pulse: 80   Resp: 20   Weight: 280 lb (127 kg)   Height: 5' 3\" (1.6 m)      Body mass index is 49.6 kg/m². Labs reviewed:     Multivitamin/mineral intake: MVI without iron  Calcium intake:   2 daily  Other:            Nutrition Assessment:   PES: Inadequate food and beverage intake r/t WLS as evidenced by loss of excess body weight 84lb. Goals   60-80gm of protein  48-64oz of fluid  Vitamin adherance  Basic adherance to WLS behavious and this document has been scanned into the chart.        [x] met     []  Not met        Plan: options provided for MVI with iron  F/u annually         Cindy Dominguez RD, LD

## 2021-06-27 NOTE — PROGRESS NOTES
[facility]  [facility]  [address]  [address]  [facility] [place] [facility] [place] [address]  Dept: [phone]    SURGICAL WEIGHT LOSS MANAGEMENT PROGRAM  PROGRESS NOTE     CC: Weight Loss    Patient: Sasha Pastor      Service Date: 6/23/2020  Visit:       Medical Record #: [id number]  Date of Surgery:   1/21/2019    Reason for Visit: Routine Post-Operative:  [] New Problem /   [] FU of existing problem    Patient here for 2.5 year visit after sleeve gastrectomy. Doing well. Having some GERD. Down 112 lbs. No nausea, vomiting, fevers/chills, chest pain or shortness of breath. Wants to try adipex    Height: 5' 3\" (1.6 m)  Highest Weight:   392 lbs    Current Visit Weight Information  Weight: 280 lb (127 kg)   BMI: Body mass index is 49.6 kg/m². Weight loss since surgery:     119    [Labs to be drawn prior to 1m, 3m, 6m, 12m, 18m, and annual visits]    Exercising?    [] Yes    [x] No       Review of Systems:     General  Negative for: [] Weight Change   [x] Fatigue   [x] Fevers & Chills    [] Appetite change [] Other:    Positive for: [x] Weight Change   [] Fatigue   [] Fevers & Chills    [] Appetite change [] Other:   Cardiac  Negative for: [x] Chest Pain   [x] Difficulty Breathing   [] Leg Cramps [x] Edema of Lower Extremeties    [] Left   [] Right      Positive for: [] Chest Pain   [] Difficulty Breathing   [] Leg Cramps [] Edema of Lower Extremeties    [] Left   [] Right   Pulmonary  Negative for: [x] Shortness of Breath [] Wheeze [x] Cough  [] Calf Pain     Positive for: [] Shortness of Breath [] Wheeze [] Cough  [] Calf Pain   Gastro-Intestinal Negative for: [] Heartburn   [] Reflux   [] Dysphagia   [] Melena   [] BRBPR  [x] Vomiting   [x] Abdominal Pain   [] Diarrhea     [] Constipation  [] Other:     Positive for: [x] Heartburn   [x] Reflux   [] Dysphagia   [] Melena   [] BRBPR  [] Vomiting   [] Abdominal Pain   [] Diarrhea     [] Constipation  [] Other:    Muskuloskeletal Negative for: [] Joint pain   [] Back pain   [] Knee Pain   [x] Muscle weakness [x] Hernia   [] Edema [] Other:     Positive for: [] Joint pain   [] Back pain   [] Knee Pain   [] Muscle weakness [] Hernia   [] Edema [] Other:    Neurologic Negative for: [x] Syncope   [x] Insomnia   [] Being treated for depression  [] Other:     Positive for: [] Syncope   [] Insomnia   [] Being treated for depression  [] Other:    Skin Negative for: [] Wound:   [] Open   [] Draining   [] Incisional     [x] Rash   [x] Hair Loss  [] Other:     Positive for: [] Wound:   [] Open   [] Draining    [] Incisional  [] Rash   [] Hair Loss  [] Other:          Physical Assessment:     /72 (Site: Right Upper Arm, Position: Sitting, Cuff Size: Large Adult)   Pulse 80   Resp 20   Ht 5' 3\" (1.6 m)   Wt 280 lb (127 kg)   BMI 49.60 kg/m²   Constitutional:  Vital signs are normal. The patient appears well-developed and well-nourished. HEENT:   Head: Normocephalic. Atraumatic  Eyes: pupils are equal and reactive. No scleral icterus is present. Neck: No mass and no thyromegaly present. Cardiovascular: Normal rate, regular rhythm, S1 normal and S2 normal.  Radial pulses present   Pulmonary/Chest: Effort normal and breath sounds normal. No retractions  Abdominal: Soft. Normal appearance. There is no organomegaly. No tenderness. There is no rigidity, no rebound, no guarding and no Alberts's sign. Musculoskeletal:        Right lower leg: Normal. No tenderness and no edema. Left lower leg: Normal. No tenderness and no edema. Neurological: The patient is alert and oriented. Moving all 4 extremities, sensation grossly intact bilateral  Skin: Skin is warm, dry and intact. Psychiatric: The patient has a normal mood and affect. Speech is normal and behavior is normal. Judgment and thought content normal. Cognition and memory are normal.        Assessment & Plan:      1. Gastroesophageal reflux disease with esophagitis without hemorrhage    2.  Morbid obesity with BMI of 45.0-49.9, adult ([address]    3. Status post laparoscopic sleeve gastrectomy            Diabetes stable, not on medications    [x] Advance Diet    [x] Daily protein (65-75gm/day)   [x] Take Vitamins   [x] Attend Support Group    [x] Exercise Regularly     Labs ordered for malabsorption post gastrectomy    OARRs ran, will trial adipex, risks and benefits discussed    Exercise, to increase    Regular diet, limit carbohydrates    Vitamin replacement discussed     Overall doing well    Pepcid for GERD    Follow up: No follow-ups on file. Orders placed this encounter:   Orders Placed This Encounter   Procedures    CBC Auto Differential    Comprehensive Metabolic Panel    Hemoglobin A1C    Iron and TIBC    Lipid Panel    Magnesium    PTH, Intact    TSH without Reflex    Vitamin A    Vitamin B1    Vitamin B12 & Folate    Vitamin D 25 Hydroxy    Zinc       New Prescriptions:   Orders Placed This Encounter   Medications    phentermine (ADIPEX-P) 37.5 MG tablet     Sig: Take 1 tablet by mouth every morning (before breakfast) for 30 days. Dispense:  30 tablet     Refill:  0    famotidine (PEPCID) 20 MG tablet     Sig: Take 1 tablet by mouth 2 times daily     Dispense:  60 tablet     Refill:  3        Electronically signed by Pan Vargas DO on 6/27/2021 at 10:08 AM    Please note that this chart was generated using voice recognition Dragon dictation software. Although every effort was made to ensure the accuracy of this automated transcription, some errors in transcription may have occurred.

## 2021-06-29 ENCOUNTER — OFFICE VISIT (OUTPATIENT)
Dept: PRIMARY CARE CLINIC | Age: 46
End: 2021-06-29
Payer: MEDICARE

## 2021-06-29 ENCOUNTER — HOSPITAL ENCOUNTER (OUTPATIENT)
Age: 46
Setting detail: SPECIMEN
Discharge: HOME | End: 2021-06-29
Payer: MEDICARE

## 2021-06-29 VITALS
HEART RATE: 72 BPM | OXYGEN SATURATION: 100 % | DIASTOLIC BLOOD PRESSURE: 77 MMHG | TEMPERATURE: 97 F | WEIGHT: 270.6 LBS | BODY MASS INDEX: 47.93 KG/M2 | SYSTOLIC BLOOD PRESSURE: 118 MMHG

## 2021-06-29 DIAGNOSIS — Z11.3 SCREEN FOR STD (SEXUALLY TRANSMITTED DISEASE): ICD-10-CM

## 2021-06-29 DIAGNOSIS — N39.41 URGE INCONTINENCE: ICD-10-CM

## 2021-06-29 DIAGNOSIS — Z12.4 ENCOUNTER FOR PAPANICOLAOU SMEAR FOR CERVICAL CANCER SCREENING: Primary | ICD-10-CM

## 2021-06-29 DIAGNOSIS — N89.8 VAGINAL DISCHARGE: ICD-10-CM

## 2021-06-29 PROCEDURE — 99396 PREV VISIT EST AGE 40-64: CPT | Performed by: NURSE PRACTITIONER

## 2021-06-29 SDOH — ECONOMIC STABILITY: FOOD INSECURITY: WITHIN THE PAST 12 MONTHS, THE FOOD YOU BOUGHT JUST DIDN'T LAST AND YOU DIDN'T HAVE MONEY TO GET MORE.: NEVER TRUE

## 2021-06-29 SDOH — ECONOMIC STABILITY: FOOD INSECURITY: WITHIN THE PAST 12 MONTHS, YOU WORRIED THAT YOUR FOOD WOULD RUN OUT BEFORE YOU GOT MONEY TO BUY MORE.: NEVER TRUE

## 2021-06-29 ASSESSMENT — PATIENT HEALTH QUESTIONNAIRE - PHQ9
SUM OF ALL RESPONSES TO PHQ QUESTIONS 1-9: 0
SUM OF ALL RESPONSES TO PHQ QUESTIONS 1-9: 0
2. FEELING DOWN, DEPRESSED OR HOPELESS: 0
SUM OF ALL RESPONSES TO PHQ9 QUESTIONS 1 & 2: 0
SUM OF ALL RESPONSES TO PHQ QUESTIONS 1-9: 0
1. LITTLE INTEREST OR PLEASURE IN DOING THINGS: 0

## 2021-06-29 ASSESSMENT — SOCIAL DETERMINANTS OF HEALTH (SDOH): HOW HARD IS IT FOR YOU TO PAY FOR THE VERY BASICS LIKE FOOD, HOUSING, MEDICAL CARE, AND HEATING?: NOT HARD AT ALL

## 2021-06-29 NOTE — PROGRESS NOTES
[address] [facility]  [id number] [facility] [address]  Dept: [phone]  Dept Fax: [phone]    Subjective:     CHIEF COMPLAINT:     Chief Complaint   Patient presents with    Gynecologic Exam     denies any vaginal symptoms       Last PAP in 2014 after delivery. Has nexplanon, irregular cycle, will skip months    No major complaints or concerns today. No previous mammogram testing. She is in a monogamous relationship with her . Since last visit did complete gastric sleeve surgery as well as hip replacement. At this time she willing to pursue physical therapy for urinary incontinence, stating we discussed it at prior visits but she was in the middle of rehabbing her hip and could not do both. PREVENTIVE HEALTH SCREENING:   Date of last pap: 2014                abnormal pap smear history: none  Date of last mammogram: n/a   Date of last DEXA scan: n/a  Date of last colonoscopy: none    History of Gestational Diabetes: No     If Yes, Glucose screening ordered:No    Family history of Breast, Ovarian, Colon or Uterine Cancer:  none         Objective:   GYNECOLOGIC HISTORY:     LMP: April  Sexually active: Yes    STD history: Yes, trichomonas in 2014    Hormone Replacement: no    Birth control method :Yes  Permanent Sterilization: No    SOCIAL HISTORY:  Seat Belt Use: Yes  Domestic Violence: No    Counseling: No  Regular exercise: Yes   Counseling: Yes  Diet discussed: Yes    REVIEW OF SYSTEMS:    1. Constitutional: No fever, chills or malaise. No weight change or fatigue  2. Head and eyes: No headache, dizziness or trauma. No visual changes  3. ENT: No hearing loss, tinnitus, sinus or taste problems  4. Hematologic: No lymphoma, Von Willebrand's, Hemophilia or bruising  5. Psychological: No depression, suicidal thoughts, crying  or anxiety  6. Breast: No skin changes, masses, mastalgia, discharge  7. Respiratory:  No SOB, cough, wheezing  8. Cardiovascular: No chest pain with exertion, palpitations, syncope, or edema  9. Gastrointestinal:  No heartburn, N/V, bloody stools, pain  10. Genito-urinary: No dysuria, hematuria, dyspareunia, abnormal bleeding  11. Musculoskeletal: No arthralgia, gout, muscle weakness  12. Neurological: No CVA, migraines, seizures, syncope, numbness  13. Dermatologic: No rashes, itching, hives  14. Lymphatic: No adenopathy    Physical Exam:     Constitutional:   Blood pressure 118/77, pulse 72, temperature 97 °F (36.1 °C), temperature source Temporal, weight 270 lb 9.6 oz (122.7 kg), SpO2 100 %, not currently breastfeeding. General Appearance: This is a well-developed, well-nourished and well-groomed female    Skin:  Inspection of the skin revealed no rashes or lesions    Neck and EENT:  The neck was supple with full range of motion and no masses. The thyroid was not enlarged and had no masses. Normal external ears are present  Nares are patent    Respiratory: The lungs were clear to auscultation bilaterally. There were no rales, rhonchi or wheezes. There was good respiratory effort. Cardiovascular: The heart was in a regular rhythm and rate was normal.  No murmur or extra sounds were noted. Breast:  The breasts are normal size and symmetrical.  There are no skin changes with position changes. The nipples are without deviations or discharge. No masses were palpated. No axillary lymphadenopathy is present. Abdomen: The abdomen is soft and non-tender. There was no guarding, rebound or rigidity. The bladder was without fullness or tenderness. No hernias were appreciated. No CVA tenderness present    Pelvic: The external genitalia has a normal appearance without masses or lesions. BUS is normal.  The vagina is pink and well rugated. The cervix is without lesions with no CMT.  scattered, white discharge present in vaginal canal which appears consistent with the candidiasis . Pap was obtained without difficulty. The uterus is normal size, shape and consistency. The adnexa are without masses or tenderness. Rectum is normal.    Psychiatric:  Alert, oriented to time, place, person and situation. There are no mood or affect changes. Assesment:     Routine annual gynecological exam      Plan:     1. Return for annual exam.  Pap per current recommendations. 2.  Mammogram: ordered. 3.  Colon Ca screening: n/a  4. Routine health maintenance: Encouraged patient to continue her routine exercise regimen. Discussed potential for change in colon cancer screening dropping to age 41. STD screening completed at patient request      Patient was seen with total face to face time of 25 minutes. More than 50% of this visit was counseling and education regarding    Diagnosis Orders   1. Encounter for Papanicolaou smear for cervical cancer screening  C.trachomatis N.gonorrhoeae DNA, Thin Prep    PAP SMEAR   2. Screen for STD (sexually transmitted disease)  C.trachomatis N.gonorrhoeae DNA, Thin Prep   3. Urge incontinence  [facility] [place]   4.  Vaginal discharge  VAGINITIS DNA PROBE

## 2021-06-29 NOTE — PROGRESS NOTES
--Adriana Taylor MA on 6/29/2021 at 1:41 PM  Visit Information    Have you changed or started any medications since your last visit including any over-the-counter medicines, vitamins, or herbal medicines? no   Are you having any side effects from any of your medications? -  no  Have you stopped taking any of your medications? Is so, why? -  no    Have you seen any other physician or provider since your last visit? Yes - Records Obtained  Have you had any other diagnostic tests since your last visit? Yes - Records Obtained  Have you been seen in the emergency room and/or had an admission to a hospital since we last saw you? No  Have you had your routine dental cleaning in the past 6 months? no    Have you activated your [employer] account? If not, what are your barriers?  Yes     Patient Care Team:  XOCHITL Kramer CNP as PCP - General (Family Medicine)  XOCHITL Kramer CNP as PCP - [facility]  Sol Cortes MD as Obstetrician (Perinatology)  Sri Saucedo DO (Inactive) as Resident (Obstetrics & Gynecology)  Desire Coy MD as Consulting Physician (Pulmonology)  Yamileth Bowden DO as Consulting Physician (Bariatric Surgery)    Medical History Review  Past Medical, Family, and Social History reviewed and does not contribute to the patient presenting condition    Health Maintenance   Topic Date Due    Hepatitis C screen  Never done    Cervical cancer screen  03/14/2017    A1C test (Diabetic or Prediabetic)  05/21/2019    Lipid screen  05/21/2023    DTaP/Tdap/Td vaccine (2 - Td or Tdap) 01/09/2029    Flu vaccine  Completed    COVID-19 Vaccine  Completed    HIV screen  Completed    Hepatitis A vaccine  Aged Out    Hepatitis B vaccine  Aged Out    Hib vaccine  Aged Out    Meningococcal (ACWY) vaccine  Aged Out    Pneumococcal 0-64 years Vaccine  Aged Out

## 2021-06-30 ENCOUNTER — TELEPHONE (OUTPATIENT)
Dept: PRIMARY CARE CLINIC | Age: 46
End: 2021-06-30

## 2021-06-30 DIAGNOSIS — N89.8 VAGINAL DISCHARGE: ICD-10-CM

## 2021-06-30 DIAGNOSIS — B37.31 VAGINA, CANDIDIASIS: Primary | ICD-10-CM

## 2021-06-30 DIAGNOSIS — Z11.3 SCREEN FOR STD (SEXUALLY TRANSMITTED DISEASE): ICD-10-CM

## 2021-06-30 DIAGNOSIS — Z12.4 ENCOUNTER FOR PAPANICOLAOU SMEAR FOR CERVICAL CANCER SCREENING: ICD-10-CM

## 2021-06-30 LAB
CHLAMYDIA BY THIN PREP: NEGATIVE
DIRECT EXAM: ABNORMAL
HPV SAMPLE: NORMAL
HPV, GENOTYPE 16: NOT DETECTED
HPV, GENOTYPE 18: NOT DETECTED
HPV, HIGH RISK OTHER: NOT DETECTED
HPV, INTERPRETATION: NORMAL
Lab: ABNORMAL
N. GONORRHOEAE DNA, THIN PREP: NEGATIVE
SPECIMEN DESCRIPTION: ABNORMAL
SPECIMEN DESCRIPTION: NORMAL
SPECIMEN DESCRIPTION: NORMAL

## 2021-06-30 RX ORDER — FLUCONAZOLE 150 MG/1
150 TABLET ORAL ONCE
Qty: 1 TABLET | Refills: 0 | Status: SHIPPED | OUTPATIENT
Start: 2021-06-30 | End: 2021-06-30

## 2021-06-30 NOTE — TELEPHONE ENCOUNTER
Let patient know their labs show they have C. albicans vulvovaginitis, a yeast infection,  treated with Diflucan, 150 mg po. Testing is negative for any other infections at this time. As discussed at her visit, it will be another week before I have final cervical cancer screening results.

## 2021-07-06 LAB — CYTOLOGY REPORT: NORMAL

## 2021-07-28 ENCOUNTER — OFFICE VISIT (OUTPATIENT)
Dept: BARIATRICS/WEIGHT MGMT | Age: 46
End: 2021-07-28

## 2021-07-28 VITALS
BODY MASS INDEX: 48.55 KG/M2 | WEIGHT: 274 LBS | HEART RATE: 82 BPM | HEIGHT: 63 IN | DIASTOLIC BLOOD PRESSURE: 82 MMHG | SYSTOLIC BLOOD PRESSURE: 110 MMHG | RESPIRATION RATE: 20 BRPM

## 2021-07-28 DIAGNOSIS — Z98.84 STATUS POST LAPAROSCOPIC SLEEVE GASTRECTOMY: Primary | ICD-10-CM

## 2021-07-28 PROCEDURE — 99024 POSTOP FOLLOW-UP VISIT: CPT | Performed by: SURGERY

## 2021-09-07 ENCOUNTER — TELEPHONE (OUTPATIENT)
Dept: PRIMARY CARE CLINIC | Age: 46
End: 2021-09-07

## 2021-09-07 NOTE — TELEPHONE ENCOUNTER
Patient is calling to see if she can get a letter stating she had a physical and that she is ok to work for a new job. She had a physical done on 6/28/21.

## 2022-01-18 ENCOUNTER — OFFICE VISIT (OUTPATIENT)
Dept: BARIATRICS/WEIGHT MGMT | Age: 47
End: 2022-01-18
Payer: MEDICARE

## 2022-01-18 VITALS
HEIGHT: 63 IN | WEIGHT: 288 LBS | HEART RATE: 90 BPM | DIASTOLIC BLOOD PRESSURE: 70 MMHG | BODY MASS INDEX: 51.03 KG/M2 | SYSTOLIC BLOOD PRESSURE: 120 MMHG

## 2022-01-18 DIAGNOSIS — E66.01 MORBID OBESITY WITH BMI OF 50.0-59.9, ADULT (HCC): ICD-10-CM

## 2022-01-18 DIAGNOSIS — K21.9 GASTROESOPHAGEAL REFLUX DISEASE WITHOUT ESOPHAGITIS: Primary | ICD-10-CM

## 2022-01-18 PROCEDURE — G8484 FLU IMMUNIZE NO ADMIN: HCPCS | Performed by: SURGERY

## 2022-01-18 PROCEDURE — G8427 DOCREV CUR MEDS BY ELIG CLIN: HCPCS | Performed by: SURGERY

## 2022-01-18 PROCEDURE — G8417 CALC BMI ABV UP PARAM F/U: HCPCS | Performed by: SURGERY

## 2022-01-18 PROCEDURE — 1036F TOBACCO NON-USER: CPT | Performed by: SURGERY

## 2022-01-18 PROCEDURE — 99213 OFFICE O/P EST LOW 20 MIN: CPT | Performed by: SURGERY

## 2022-01-18 RX ORDER — PHENTERMINE HYDROCHLORIDE 37.5 MG/1
37.5 TABLET ORAL
Qty: 30 TABLET | Refills: 0 | Status: SHIPPED | OUTPATIENT
Start: 2022-01-18 | End: 2022-02-17

## 2022-01-18 NOTE — PROGRESS NOTES
Medical Nutrition Therapy  Metabolic and Bariatric surgery  Annual follow up note         Pt reports: 3 years         Vitals:   Vitals:    01/18/22 1408   BP: 120/70   Site: Right Upper Arm   Position: Sitting   Cuff Size: Large Adult   Pulse: 90   Weight: 288 lb (130.6 kg)   Height: 5' 3\" (1.6 m)      Body mass index is 51.02 kg/m². Labs reviewed:     Multivitamin/mineral intake:daily  Calcium intake:   daily  Other:            Nutrition Assessment:   PES:        Goals   60-80gm of protein  48-64oz of fluid  Vitamin adherance  Basic adherance to WLS behavious and this document has been scanned into the chart.        [x] met     []  Not met        Plan:   F/u annually         Nette Mena, MS, RD, LD

## 2022-01-25 NOTE — PROGRESS NOTES
[address] [facility] MIN INVASIVE BARIATRIC SURG  [address] [facility] [place]  [address]  [facility] [address]  Dept: [phone]    SURGICAL WEIGHT LOSS MANAGEMENT PROGRAM  PROGRESS NOTE     CC: Weight Loss    Patient: Luna Salazar      Service Date: 1/18/2022  Visit:    Mckayla   Medical Record #: [id number]  Date of Surgery:   1/21/2019    Reason for Visit: Routine Post-Operative:  [] New Problem /   [] FU of existing problem    Here for 3 year visit after sleeve gastrectomy. Down 104 lbs. Would like more weight loss. She also has been having GERD. She is relatively controlled with Pepcid. Exercise somewhat limited. No other new complaints. No dysphagia    Height: 5' 3\" (1.6 m)  Highest Weight:   392 lbs    Current Visit Weight Information  Weight: 288 lb (130.6 kg)   BMI: Body mass index is 51.02 kg/m². Weight loss since surgery:     104    [Labs to be drawn prior to 1m, 3m, 6m, 12m, 18m, and annual visits]    Exercising?    [] Yes    [x] No       Review of Systems:     General  Negative for: [] Weight Change   [x] Fatigue   [x] Fevers & Chills    [] Appetite change [] Other:    Positive for: [x] Weight Change   [] Fatigue   [] Fevers & Chills    [] Appetite change [] Other:   Cardiac  Negative for: [x] Chest Pain   [x] Difficulty Breathing   [] Leg Cramps [x] Edema of Lower Extremeties    [] Left   [] Right      Positive for: [] Chest Pain   [] Difficulty Breathing   [] Leg Cramps [] Edema of Lower Extremeties    [] Left   [] Right   Pulmonary  Negative for: [x] Shortness of Breath [x] Wheeze [x] Cough  [] Calf Pain     Positive for: [] Shortness of Breath [] Wheeze [] Cough  [] Calf Pain   Gastro-Intestinal Negative for: [] Heartburn   [] Reflux   [x] Dysphagia   [] Melena   [x] BRBPR  [x] Vomiting   [x] Abdominal Pain   [] Diarrhea     [x] Constipation  [] Other:     Positive for: [x] Heartburn   [x] Reflux   [] Dysphagia   [] Melena   [] BRBPR  [] Vomiting   [] Abdominal Pain   [] Diarrhea     [] Constipation  [] Other:    Muskuloskeletal Negative for: [] Joint pain   [] Back pain   [] Knee Pain   [x] Muscle weakness [x] Hernia   [x] Edema [] Other:     Positive for: [] Joint pain   [] Back pain   [] Knee Pain   [] Muscle weakness [] Hernia   [] Edema [] Other:    Neurologic Negative for: [x] Syncope   [x] Insomnia   [] Being treated for depression  [] Other:     Positive for: [] Syncope   [] Insomnia   [] Being treated for depression  [] Other:    Skin Negative for: [x] Wound:   [] Open   [] Draining   [] Incisional     [x] Rash   [x] Hair Loss  [] Other:     Positive for: [] Wound:   [] Open   [] Draining    [] Incisional  [] Rash   [] Hair Loss  [] Other:          Physical Assessment:     /70 (Site: Right Upper Arm, Position: Sitting, Cuff Size: Large Adult)   Pulse 90   Ht 5' 3\" (1.6 m)   Wt 288 lb (130.6 kg)   BMI 51.02 kg/m²   Constitutional:  Vital signs are normal. The patient appears well-developed and well-nourished. HEENT:   Head: Normocephalic. Atraumatic  Eyes: pupils are equal and reactive. No scleral icterus is present. Neck: No mass and no thyromegaly present. Cardiovascular: Normal rate, regular rhythm, S1 normal and S2 normal.  Radial pulses present   Pulmonary/Chest: Effort normal and breath sounds normal. No retractions  Abdominal: Soft. Normal appearance. There is no organomegaly. No tenderness. There is no rigidity, no rebound, no guarding and no Alberts's sign. Musculoskeletal:        Right lower leg: Normal. No tenderness and no edema. Left lower leg: Normal. No tenderness and no edema. Lymphadenopathy:     No cervical adenopathy, No Exrtemity Adenopathy. Neurological: The patient is alert and oriented. Moving all 4 extremities, sensation grossly intact bilateral  Skin: Skin is warm, dry and intact. Psychiatric: The patient has a normal mood and affect.  Speech is normal and behavior is normal. Judgment and thought content normal. Cognition and memory are normal.       Assessment & Plan:      1. Gastroesophageal reflux disease without esophagitis    2. Morbid obesity with BMI of 50.0-59.9, adult (HCC)            [x] Advance Diet    [x] Daily protein (65-75gm/day)   [x] Take Vitamins   [x] Attend Support Group    [x] Exercise Regularly     GERD  Stable, Continue Pepcid  Sleep with head of bed elevated  No meals 3 hours prior to bedtime  Options of Revision to bypass discussed    Morbid Obesity, BMI 46  OARRs ran, will trial adipex, risks and benefits discussed  Exercise, to increase to 30 minutes daily  Regular diet, limit carbohydrates, increase protein sources  Vitamin replacement discussed       Follow up: No follow-ups on file. Orders placed this encounter:   No orders of the defined types were placed in this encounter. New Prescriptions:   Orders Placed This Encounter   Medications    phentermine (ADIPEX-P) 37.5 MG tablet     Sig: Take 1 tablet by mouth every morning (before breakfast) for 30 days. Dispense:  30 tablet     Refill:  0        Electronically signed by Herminio Martins DO on 1/24/2022 at 7:45 PM    Please note that this chart was generated using voice recognition Dragon dictation software. Although every effort was made to ensure the accuracy of this automated transcription, some errors in transcription may have occurred.

## 2022-02-11 ENCOUNTER — OFFICE VISIT (OUTPATIENT)
Dept: BARIATRICS/WEIGHT MGMT | Age: 47
End: 2022-02-11
Payer: MEDICARE

## 2022-02-11 VITALS
BODY MASS INDEX: 48.55 KG/M2 | HEIGHT: 63 IN | RESPIRATION RATE: 20 BRPM | DIASTOLIC BLOOD PRESSURE: 74 MMHG | WEIGHT: 274 LBS | SYSTOLIC BLOOD PRESSURE: 132 MMHG | HEART RATE: 76 BPM

## 2022-02-11 DIAGNOSIS — E66.01 MORBID OBESITY WITH BMI OF 45.0-49.9, ADULT (HCC): Primary | ICD-10-CM

## 2022-02-11 PROCEDURE — G8427 DOCREV CUR MEDS BY ELIG CLIN: HCPCS | Performed by: SURGERY

## 2022-02-11 PROCEDURE — 99212 OFFICE O/P EST SF 10 MIN: CPT | Performed by: SURGERY

## 2022-02-11 PROCEDURE — 1036F TOBACCO NON-USER: CPT | Performed by: SURGERY

## 2022-02-11 PROCEDURE — G8484 FLU IMMUNIZE NO ADMIN: HCPCS | Performed by: SURGERY

## 2022-02-11 PROCEDURE — G8417 CALC BMI ABV UP PARAM F/U: HCPCS | Performed by: SURGERY

## 2022-02-11 RX ORDER — PHENTERMINE HYDROCHLORIDE 37.5 MG/1
37.5 TABLET ORAL
Qty: 30 TABLET | Refills: 0 | Status: SHIPPED | OUTPATIENT
Start: 2022-02-11 | End: 2022-03-03

## 2022-02-11 NOTE — PROGRESS NOTES
[address] [facility] INVASIVE BARIATRIC SURG  [address] [place] [address] [place]  [address]  [facility] [address]  Dept: [phone]    SURGICAL WEIGHT LOSS MANAGEMENT PROGRAM  PROGRESS NOTE     CC: Weight Loss    Patient: Brandon Helm      Service Date: 2/11/2022  Visit:    Adipex   Medical Record #: [id number]  Date of Surgery:   1/21/2019    Reason for Visit: Routine Post-Operative:  [] New Problem /   [] FU of existing problem    Here for 3 year visit after sleeve gastrectomy. Down 118 lbs. Would like more weight loss. We started on Adipex and she has had no problems with Adipex. She lost 14 lbs in the last month. No constipation, palpitations or anxiety    Height: 5' 3\" (1.6 m)  Highest Weight:   392 lbs    Current Visit Weight Information  Weight: 274 lb (124.3 kg)   BMI: Body mass index is 48.54 kg/m². Weight loss since surgery:     104    [Labs to be drawn prior to 1m, 3m, 6m, 12m, 18m, and annual visits]    Exercising?    [] Yes    [x] No       Review of Systems:     General  Negative for: [] Weight Change   [x] Fatigue   [x] Fevers & Chills    [] Appetite change [] Other:    Positive for: [x] Weight Change   [] Fatigue   [] Fevers & Chills    [] Appetite change [] Other:   Cardiac  Negative for: [x] Chest Pain   [x] Difficulty Breathing   [] Leg Cramps [x] Edema of Lower Extremeties    [] Left   [] Right      Positive for: [] Chest Pain   [] Difficulty Breathing   [] Leg Cramps [] Edema of Lower Extremeties    [] Left   [] Right   Pulmonary  Negative for: [x] Shortness of Breath [] Wheeze [x] Cough  [] Calf Pain     Positive for: [] Shortness of Breath [] Wheeze [] Cough  [] Calf Pain   Gastro-Intestinal Negative for: [] Heartburn   [] Reflux   [] Dysphagia   [] Melena   [] BRBPR  [x] Vomiting   [x] Abdominal Pain   [] Diarrhea     [] Constipation  [] Other:     Positive for: [] Heartburn   [] Reflux   [] Dysphagia   [] Melena   [] BRBPR  [] Vomiting   [] Abdominal Pain [] Diarrhea     [] Constipation  [] Other:    Muskuloskeletal Negative for: [] Joint pain   [] Back pain   [] Knee Pain   [x] Muscle weakness [x] Hernia   [] Edema [] Other:     Positive for: [] Joint pain   [] Back pain   [] Knee Pain   [] Muscle weakness [] Hernia   [] Edema [] Other:    Neurologic Negative for: [x] Syncope   [x] Insomnia   [] Being treated for depression  [] Other:     Positive for: [] Syncope   [] Insomnia   [] Being treated for depression  [] Other:    Skin Negative for: [] Wound:   [] Open   [] Draining   [] Incisional     [x] Rash   [x] Hair Loss  [] Other:     Positive for: [] Wound:   [] Open   [] Draining    [] Incisional  [] Rash   [] Hair Loss  [] Other:            Physical Assessment:     /74 (Site: Right Upper Arm, Position: Sitting, Cuff Size: Large Adult)   Pulse 76   Resp 20   Ht 5' 3\" (1.6 m)   Wt 274 lb (124.3 kg)   BMI 48.54 kg/m²   Constitutional:  Vital signs are normal. The patient appears well-developed and well-nourished. HEENT:   Head: Normocephalic. Atraumatic  Eyes: pupils are equal and reactive. No scleral icterus is present. Neck: No mass and no thyromegaly present. Cardiovascular: Normal rate, regular rhythm, S1 normal and S2 normal.  Radial pulses present   Pulmonary/Chest: Effort normal and breath sounds normal. No retractions  Abdominal: Soft. Normal appearance. There is no organomegaly. No tenderness. There is no rigidity, no rebound, no guarding and no Alberts's sign. Musculoskeletal:        Right lower leg: Normal. No tenderness and no edema. Left lower leg: Normal. No tenderness and no edema. Neurological: The patient is alert and oriented. Moving all 4 extremities, sensation grossly intact bilateral  Skin: Skin is warm, dry and intact. Psychiatric: The patient has a normal mood and affect. Speech is normal and behavior is normal. Judgment and thought content normal. Cognition and memory are normal.         Assessment & Plan:      1. Morbid obesity with BMI of 45.0-49.9, adult (HCC)            [x] Advance Diet    [x] Daily protein (65-75gm/day)   [x] Take Vitamins   [x] Attend Support Group    [x] Exercise Regularly       Morbid Obesity, BMI 46  OARRs ran, will continue adipex, risks and benefits discussed  Exercise, to increase to 30 minutes daily  Regular diet, limit carbohydrates, increase protein sources  Vitamin replacement discussed   Dietician visit today    Follow up: No follow-ups on file. Orders placed this encounter:   No orders of the defined types were placed in this encounter. New Prescriptions:   Orders Placed This Encounter   Medications    phentermine (ADIPEX-P) 37.5 MG tablet     Sig: Take 1 tablet by mouth every morning (before breakfast) for 30 days. Dispense:  30 tablet     Refill:  0        Electronically signed by Marcial Mccartney DO on 2/11/2022 at 2:24 PM    Please note that this chart was generated using voice recognition Dragon dictation software. Although every effort was made to ensure the accuracy of this automated transcription, some errors in transcription may have occurred.

## 2022-03-03 ENCOUNTER — OFFICE VISIT (OUTPATIENT)
Dept: BARIATRICS/WEIGHT MGMT | Age: 47
End: 2022-03-03
Payer: MEDICARE

## 2022-03-03 VITALS
BODY MASS INDEX: 48.2 KG/M2 | HEART RATE: 80 BPM | HEIGHT: 63 IN | DIASTOLIC BLOOD PRESSURE: 70 MMHG | WEIGHT: 272 LBS | SYSTOLIC BLOOD PRESSURE: 110 MMHG

## 2022-03-03 DIAGNOSIS — E66.01 MORBID OBESITY WITH BMI OF 45.0-49.9, ADULT (HCC): Primary | ICD-10-CM

## 2022-03-03 PROCEDURE — G8417 CALC BMI ABV UP PARAM F/U: HCPCS | Performed by: SURGERY

## 2022-03-03 PROCEDURE — 99212 OFFICE O/P EST SF 10 MIN: CPT | Performed by: SURGERY

## 2022-03-03 PROCEDURE — 1036F TOBACCO NON-USER: CPT | Performed by: SURGERY

## 2022-03-03 PROCEDURE — G8484 FLU IMMUNIZE NO ADMIN: HCPCS | Performed by: SURGERY

## 2022-03-03 PROCEDURE — G8427 DOCREV CUR MEDS BY ELIG CLIN: HCPCS | Performed by: SURGERY

## 2022-03-03 RX ORDER — PHENTERMINE HYDROCHLORIDE 37.5 MG/1
37.5 TABLET ORAL
Qty: 30 TABLET | Refills: 0 | Status: SHIPPED | OUTPATIENT
Start: 2022-03-03 | End: 2022-04-02

## 2022-03-11 NOTE — PROGRESS NOTES
[address] [facility] MIN INVASIVE BARIATRIC SURG  [address] [facility] [place]  [address]  [facility] [address]  Dept: [phone]    SURGICAL WEIGHT LOSS MANAGEMENT PROGRAM  PROGRESS NOTE     CC: Weight Loss    Patient: Luna Salazar      Service Date: 3/3/2022  Visit:    Adipex   Medical Record #: [id number]  Date of Surgery:   1/21/2019    Reason for Visit: Routine Post-Operative:  [] New Problem /   [] FU of existing problem    Here for Adipex follow up. No palpitations, insomnia or anxiety. Having stools. Weight is down again. She denies complaints and would like to continue Adipex for 1 last month. Down over 15 lbs with Adipex    Height: 5' 3\" (1.6 m)  Highest Weight:   392 lbs    Current Visit Weight Information  Weight: 272 lb (123.4 kg)   BMI: Body mass index is 48.18 kg/m². Weight loss since surgery:     120    [Labs to be drawn prior to 1m, 3m, 6m, 12m, 18m, and annual visits]    Exercising?    [] Yes    [x] No       Review of Systems:     General  Negative for: [] Weight Change   [x] Fatigue   [x] Fevers & Chills    [] Appetite change [] Other:    Positive for: [x] Weight Change   [] Fatigue   [] Fevers & Chills    [] Appetite change [] Other:   Cardiac  Negative for: [x] Chest Pain   [x] Difficulty Breathing   [] Leg Cramps [x] Edema of Lower Extremeties    [] Left   [] Right      Positive for: [] Chest Pain   [] Difficulty Breathing   [] Leg Cramps [] Edema of Lower Extremeties    [] Left   [] Right   Pulmonary  Negative for: [x] Shortness of Breath [] Wheeze [x] Cough  [] Calf Pain     Positive for: [] Shortness of Breath [] Wheeze [] Cough  [] Calf Pain   Gastro-Intestinal Negative for: [] Heartburn   [] Reflux   [] Dysphagia   [] Melena   [] BRBPR  [x] Vomiting   [x] Abdominal Pain   [] Diarrhea     [] Constipation  [] Other:     Positive for: [] Heartburn   [] Reflux   [] Dysphagia   [] Melena   [] BRBPR  [] Vomiting   [] Abdominal Pain   [] Diarrhea     [] Constipation  [] Other:    Muskuloskeletal Negative for: [] Joint pain   [] Back pain   [] Knee Pain   [x] Muscle weakness [x] Hernia   [] Edema [] Other:     Positive for: [] Joint pain   [] Back pain   [] Knee Pain   [] Muscle weakness [] Hernia   [] Edema [] Other:    Neurologic Negative for: [x] Syncope   [] Insomnia   [x] Being treated for depression  [] Other:     Positive for: [] Syncope   [] Insomnia   [] Being treated for depression  [] Other:    Skin Negative for: [] Wound:   [] Open   [] Draining   [] Incisional     [x] Rash   [x] Hair Loss  [] Other:     Positive for: [] Wound:   [] Open   [] Draining    [] Incisional  [] Rash   [] Hair Loss  [] Other:            Physical Assessment:     /70 (Site: Right Upper Arm, Position: Sitting, Cuff Size: Large Adult)   Pulse 80   Ht 5' 3\" (1.6 m)   Wt 272 lb (123.4 kg)   BMI 48.18 kg/m²   Constitutional:  Vital signs are normal. The patient appears well-developed and well-nourished. HEENT:   Head: Normocephalic. Atraumatic  Eyes: pupils are equal and reactive. No scleral icterus is present. Neck: No mass and no thyromegaly present. Cardiovascular: Normal rate, regular rhythm, S1 normal and S2 normal.  Radial pulses present   Pulmonary/Chest: Effort normal and breath sounds normal. No retractions  Abdominal: Soft. Normal appearance. There is no organomegaly. No tenderness. There is no rigidity, no rebound, no guarding and no Alberts's sign. Musculoskeletal:        Right lower leg: Normal. No tenderness and no edema. Left lower leg: Normal. No tenderness and no edema. Neurological: The patient is alert and oriented. Moving all 4 extremities, sensation grossly intact bilateral  Skin: Skin is warm, dry and intact. Psychiatric: The patient has a normal mood and affect. Speech is normal and behavior is normal. Judgment and thought content normal. Cognition and memory are normal.       Assessment & Plan:      1.  Morbid obesity with BMI of 45.0-49.9, adult ([facility] [address]            [x] Advance Diet    [x] Daily protein (65-75gm/day)   [x] Take Vitamins   [x] Attend Support Group    [x] Exercise Regularly       Morbid Obesity, BMI 46  OARRs ran, will continue adipex, risks and benefits discussed  Exercise daily  Regular diet, limit carbohydrates, increase protein sources  Dietician visit    Follow up: No follow-ups on file. Orders placed this encounter:   No orders of the defined types were placed in this encounter. New Prescriptions:   Orders Placed This Encounter   Medications    phentermine (ADIPEX-P) 37.5 MG tablet     Sig: Take 1 tablet by mouth every morning (before breakfast) for 30 days. Dispense:  30 tablet     Refill:  0        Electronically signed by Robel Vargas DO on 3/10/2022 at 7:45 PM    Please note that this chart was generated using voice recognition Dragon dictation software. Although every effort was made to ensure the accuracy of this automated transcription, some errors in transcription may have occurred.

## 2022-07-19 ENCOUNTER — OFFICE VISIT (OUTPATIENT)
Dept: PRIMARY CARE CLINIC | Age: 47
End: 2022-07-19
Payer: MEDICARE

## 2022-07-19 VITALS
WEIGHT: 281.8 LBS | HEIGHT: 63 IN | HEART RATE: 84 BPM | BODY MASS INDEX: 49.93 KG/M2 | OXYGEN SATURATION: 100 % | TEMPERATURE: 97.7 F | DIASTOLIC BLOOD PRESSURE: 76 MMHG | SYSTOLIC BLOOD PRESSURE: 127 MMHG

## 2022-07-19 DIAGNOSIS — N39.41 URGE INCONTINENCE: ICD-10-CM

## 2022-07-19 DIAGNOSIS — I87.2 VENOUS INSUFFICIENCY OF BOTH LOWER EXTREMITIES: ICD-10-CM

## 2022-07-19 DIAGNOSIS — Z72.89 OTHER PROBLEMS RELATED TO LIFESTYLE: ICD-10-CM

## 2022-07-19 DIAGNOSIS — Z12.31 BREAST CANCER SCREENING BY MAMMOGRAM: ICD-10-CM

## 2022-07-19 DIAGNOSIS — Z11.59 NEED FOR HEPATITIS C SCREENING TEST: ICD-10-CM

## 2022-07-19 DIAGNOSIS — Z13.1 DIABETES MELLITUS SCREENING: ICD-10-CM

## 2022-07-19 DIAGNOSIS — Z12.11 COLON CANCER SCREENING: ICD-10-CM

## 2022-07-19 DIAGNOSIS — Z00.01 ENCOUNTER FOR WELL ADULT EXAM WITH ABNORMAL FINDINGS: Primary | ICD-10-CM

## 2022-07-19 PROCEDURE — 99396 PREV VISIT EST AGE 40-64: CPT | Performed by: NURSE PRACTITIONER

## 2022-07-19 SDOH — ECONOMIC STABILITY: TRANSPORTATION INSECURITY
IN THE PAST 12 MONTHS, HAS THE LACK OF TRANSPORTATION KEPT YOU FROM MEDICAL APPOINTMENTS OR FROM GETTING MEDICATIONS?: NO

## 2022-07-19 SDOH — ECONOMIC STABILITY: FOOD INSECURITY: WITHIN THE PAST 12 MONTHS, YOU WORRIED THAT YOUR FOOD WOULD RUN OUT BEFORE YOU GOT MONEY TO BUY MORE.: NEVER TRUE

## 2022-07-19 SDOH — ECONOMIC STABILITY: FOOD INSECURITY: WITHIN THE PAST 12 MONTHS, THE FOOD YOU BOUGHT JUST DIDN'T LAST AND YOU DIDN'T HAVE MONEY TO GET MORE.: NEVER TRUE

## 2022-07-19 SDOH — ECONOMIC STABILITY: TRANSPORTATION INSECURITY
IN THE PAST 12 MONTHS, HAS LACK OF TRANSPORTATION KEPT YOU FROM MEETINGS, WORK, OR FROM GETTING THINGS NEEDED FOR DAILY LIVING?: NO

## 2022-07-19 ASSESSMENT — PATIENT HEALTH QUESTIONNAIRE - PHQ9
SUM OF ALL RESPONSES TO PHQ9 QUESTIONS 1 & 2: 0
SUM OF ALL RESPONSES TO PHQ QUESTIONS 1-9: 0
2. FEELING DOWN, DEPRESSED OR HOPELESS: 0
1. LITTLE INTEREST OR PLEASURE IN DOING THINGS: 0
SUM OF ALL RESPONSES TO PHQ QUESTIONS 1-9: 0

## 2022-07-19 ASSESSMENT — SOCIAL DETERMINANTS OF HEALTH (SDOH): HOW HARD IS IT FOR YOU TO PAY FOR THE VERY BASICS LIKE FOOD, HOUSING, MEDICAL CARE, AND HEATING?: NOT HARD AT ALL

## 2022-07-19 ASSESSMENT — ENCOUNTER SYMPTOMS
SHORTNESS OF BREATH: 0
VOMITING: 0
WHEEZING: 0
DIARRHEA: 0
TROUBLE SWALLOWING: 0
BLOOD IN STOOL: 0
ABDOMINAL PAIN: 0

## 2022-07-19 NOTE — PROGRESS NOTES
Well Adult Note  Name: Minnette Kayser Date: 2022   MRN: [id number] Sex: Female   Age: 42 y.o. Ethnicity: Non- / Non    : 1975 Race: Harrvu Sprain / Clay March is here for well adult exam.  History:  Hx of bariatric surgery, feels good and has energy. Has lonnie having leg swelling, worse the longer she is on her feet and alleviated when legs are elevated, improved in the morning. History of 7 pregnancies. Has tried to buy compression socks, none seem to fit her. No previous breast cancer screening, denies family history of breast cancer in siblings or mother. No previous colon cancer screening, no known history in parents or siblings. Still having urge incontinence. Does feel that symptoms have mildly improved since her hip surgery, still requires use of a pad and has trouble controlling bladder once she has the urge to urinate. Review of Systems   Constitutional:  Negative for appetite change, fever and unexpected weight change. HENT:  Negative for hearing loss and trouble swallowing. Eyes:  Negative for visual disturbance. Respiratory:  Negative for shortness of breath and wheezing. Cardiovascular:  Positive for leg swelling. Negative for chest pain and palpitations. Gastrointestinal:  Negative for abdominal pain, blood in stool, diarrhea and vomiting. Endocrine: Negative for polydipsia and polyuria. Genitourinary:  Positive for urgency. Negative for frequency and hematuria. Musculoskeletal:  Negative for myalgias and neck pain. Neurological:  Negative for seizures, numbness and headaches. Psychiatric/Behavioral:  Negative for suicidal ideas. The patient is not nervous/anxious. Allergies   Allergen Reactions    Motrin [Ibuprofen]      Pt states that it causes her stomach to bleed    Shellfish-Derived Products Hives and Itching         Prior to Visit Medications    Medication Sig Taking?  Authorizing Provider Elastic Bandages & Supports (JOBST ANTI-EM KNEE LENGTH LG) MISC Wear daily while ambulating Yes Jazmin Shetty APRN - CNP   famotidine (PEPCID) 20 MG tablet Take 1 tablet by mouth 2 times daily Yes Salazar Wilson DO   Calcium Citrate-Vitamin D (CALCIUM CITRATE + D3 PO) Take by mouth Yes Historical Provider, MD   Multiple Vitamins-Minerals (THERAPEUTIC MULTIVITAMIN-MINERALS) tablet Take 1 tablet by mouth daily Yes Historical Provider, MD         Past Medical History:   Diagnosis Date    Anemia 9/18/2014    Chronic back pain     Diabetes mellitus type 2 in obese (HCC)     On Metformin    GERD (gastroesophageal reflux disease)     Hip pain, chronic     Bilateral, bilat surgery as child    Hyperlipidemia 1/20/2015    Morbid obesity (HCC)     ZAID (obstructive sleep apnea)     CPAP nightly    Osteoarthritis     Syncope     Trichomonas contact, treated     Urinary incontinence        Past Surgical History:   Procedure Laterality Date    DENTAL SURGERY  01/15/2019    with local at dental office    HIP SURGERY Bilateral     at age13 with pins/rods Bilateral    SLEEVE GASTRECTOMY  01/21/2019     ROBOTIC GASTRECTOMY SLEEVE LAPAROSCOPIC, LIVER BIOPSY, EGD    SLEEVE GASTRECTOMY N/A 1/21/2019    XI ROBOTIC GASTRECTOMY SLEEVE LAPAROSCOPIC, EGD performed by Salazar Wilson DO at [address] [place] [address]  2018         Family History   Problem Relation Age of Onset    High Blood Pressure Father     Diabetes Mother     Hypertension Mother     Arthritis Mother     Depression Mother     High Cholesterol Mother     Kidney Disease Mother         soon to have dislysis    Diabetes Maternal Uncle     Diabetes Maternal Aunt     High Blood Pressure Maternal Aunt     High Cholesterol Brother     Schizophrenia Brother     No Known Problems Maternal Grandmother     High Blood Pressure Maternal Grandfather     Diabetes Paternal Grandmother     Heart Failure Paternal Grandmother     Heart Failure Paternal Uncle Diabetes Paternal Aunt     High Blood Pressure Paternal Aunt     Kidney Disease Paternal Aunt         Dialysis    Diabetes Maternal Uncle     Other Daughter         Hip problem    Diabetes Daughter         on Metformin       Social History     Tobacco Use    Smoking status: Former     Packs/day: 1.00     Years: 5.00     Pack years: 5.00     Types: Cigars, Cigarettes     Start date: 82     Quit date: 2013     Years since quittin.9    Smokeless tobacco: Never   Vaping Use    Vaping Use: Never used   Substance Use Topics    Alcohol use: No    Drug use: No       Objective   /76 (Site: Right Lower Arm, Position: Sitting)   Pulse 84   Temp 97.7 °F (36.5 °C) (Infrared)   Ht 5' 3\" (1.6 m)   Wt 281 lb 12.8 oz (127.8 kg)   SpO2 100%   BMI 49.92 kg/m²   Wt Readings from Last 3 Encounters:   22 281 lb 12.8 oz (127.8 kg)   22 272 lb (123.4 kg)   22 274 lb (124.3 kg)     There were no vitals filed for this visit. Physical Exam  Vitals and nursing note reviewed. Constitutional:       General: She is not in acute distress. Appearance: She is well-developed. She is obese. HENT:      Head: Normocephalic. Eyes:      General: No scleral icterus. Pupils: Pupils are equal, round, and reactive to light. Cardiovascular:      Rate and Rhythm: Normal rate and regular rhythm. Pulmonary:      Effort: Pulmonary effort is normal.      Breath sounds: Normal breath sounds. Abdominal:      Palpations: Abdomen is soft. Musculoskeletal:      Cervical back: Normal range of motion and neck supple. Right lower le+ Pitting Edema present. Left lower le+ Pitting Edema present. Skin:     General: Skin is warm and dry. Neurological:      Mental Status: She is alert and oriented to person, place, and time. Coordination: Coordination normal.   Psychiatric:         Behavior: Behavior normal. Behavior is cooperative. Thought Content:  Thought content normal. Judgment: Judgment normal.         Assessment   Plan   1. Encounter for well adult exam without abnormal findings  2. Other problems related to lifestyle  3. Need for hepatitis C screening test  -     Hepatitis C Antibody; Future  4. Diabetes mellitus screening  -     Hemoglobin A1c; Future  5. Venous insufficiency of both lower extremities  -     JIMY - Marya Perea MD, Vascular Surgery, [place]  -     Elastic Bandages & Supports (JOBST ANTI-EM KNEE LENGTH LG) MISC; Wear daily while ambulating, Disp-2 each, R-1Print  6. Urge incontinence  -     [facility] - [facility]  7. Colon cancer screening  -     Kristan Varghese MD, Gastroenterology, [place]  8. Breast cancer screening by mammogram  -     [facility] DIGITAL SCREEN W OR WO CAD BILATERAL; Future         Personalized Preventive Plan   Current Health Maintenance Status  Immunization History   Administered Date(s) Administered    Influenza Virus Vaccine 10/02/2018    Pneumococcal Polysaccharide (Opeziosun58) 01/09/2019    Tdap (Boostrix, Adacel) 01/09/2019        Health Maintenance   Topic Date Due    COVID-19 Vaccine (1) Never done    Hepatitis C screen  Never done    A1C test (Diabetic or Prediabetic)  05/21/2019    Depression Screen  06/29/2022    Flu vaccine (1) 09/01/2022    Lipids  05/21/2023    Cervical cancer screen  06/29/2026    DTaP/Tdap/Td vaccine (2 - Td or Tdap) 01/09/2029    HIV screen  Completed    Hepatitis A vaccine  Aged Out    Hepatitis B vaccine  Aged Out    Hib vaccine  Aged Out    Meningococcal (ACWY) vaccine  Aged Out    Pneumococcal 0-64 years Vaccine  Aged Out     Recommendations for [employer] Due: see orders and patient instructions/AVS.    Return in about 1 year (around 7/19/2023).

## 2022-07-19 NOTE — LETTER
[facility]  [facility] [address] [place] [id number]  Phone: [phone]  Fax: [address] [facility], XOCHITL - CNP        July 19, 2022     Patient: Ekaterina Masters   YOB: 1975   Date of Visit: 7/19/2022       To Whom It May Concern: It is my medical opinion that Zeynep Lenz can fully participate immediately with no restrictions. Last physical exam done today, 7/19/2022. If you have any questions or concerns, please don't hesitate to call.     Sincerely,        XOCHITL Valle CNP

## 2022-07-19 NOTE — PROGRESS NOTES
[facility]  [address] [facility] [address]  Dept: [phone]  Dept Fax: [phone]    Patient Care Team:  XOCHITL Ravi CNP as PCP - General (Family Medicine)  XOCHITL Ravi CNP as PCP - [facility]  Jacob Dobson MD as Obstetrician (Perinatology)  Sarah Wynn DO as Resident (Obstetrics & Gynecology)  Orville Li MD as Consulting Physician (Pulmonology)  Joselyn Jeter DO as Consulting Physician (Bariatric Surgery)    7/19/2022   Visit Information    Have you changed or started any medications since your last visit including any over-the-counter medicines, vitamins, or herbal medicines? no   Are you having any side effects from any of your medications? -  no  Have you stopped taking any of your medications? Is so, why? -  no    Have you seen any other physician or provider since your last visit? No  Have you had any other diagnostic tests since your last visit? No  Have you been seen in the emergency room and/or had an admission to a hospital since we last saw you? No  Have you had your routine dental cleaning in the past 6 months? no    Have you activated your [employer] account? If not, what are your barriers?  Yes     Patient Care Team:  XOCHITL Ravi CNP as PCP - General (Family Medicine)  XOCHITL Ravi CNP as PCP - [facility]  Jacob Dobson MD as Obstetrician (Perinatology)  Sarah Wynn DO as Resident (Obstetrics & Gynecology)  Orville Li MD as Consulting Physician (Pulmonology)  Joselyn Jeter DO as Consulting Physician (Bariatric Surgery)    Medical History Review  Past Medical, Family, and Social History reviewed and does not contribute to the patient presenting condition    Health Maintenance   Topic Date Due    COVID-19 Vaccine (1) Never done    Hepatitis C screen  Never done    A1C test (Diabetic or Prediabetic) 05/21/2019    Depression Screen  06/29/2022    Flu vaccine (1) 09/01/2022    Lipids  05/21/2023    Cervical cancer screen  06/29/2026    DTaP/Tdap/Td vaccine (2 - Td or Tdap) 01/09/2029    HIV screen  Completed    Hepatitis A vaccine  Aged Out    Hepatitis B vaccine  Aged Out    Hib vaccine  Aged Out    Meningococcal (ACWY) vaccine  Aged Out    Pneumococcal 0-64 years Vaccine  Aged Out

## 2022-07-28 ENCOUNTER — OFFICE VISIT (OUTPATIENT)
Dept: BARIATRICS/WEIGHT MGMT | Age: 47
End: 2022-07-28
Payer: MEDICARE

## 2022-07-28 VITALS
BODY MASS INDEX: 50.14 KG/M2 | HEART RATE: 75 BPM | SYSTOLIC BLOOD PRESSURE: 122 MMHG | DIASTOLIC BLOOD PRESSURE: 69 MMHG | WEIGHT: 283 LBS | HEIGHT: 63 IN

## 2022-07-28 DIAGNOSIS — Z98.84 STATUS POST LAPAROSCOPIC SLEEVE GASTRECTOMY: ICD-10-CM

## 2022-07-28 DIAGNOSIS — K21.9 GASTROESOPHAGEAL REFLUX DISEASE WITHOUT ESOPHAGITIS: Primary | ICD-10-CM

## 2022-07-28 DIAGNOSIS — E66.01 MORBID OBESITY WITH BMI OF 50.0-59.9, ADULT (HCC): ICD-10-CM

## 2022-07-28 PROCEDURE — G8417 CALC BMI ABV UP PARAM F/U: HCPCS | Performed by: SURGERY

## 2022-07-28 PROCEDURE — 99213 OFFICE O/P EST LOW 20 MIN: CPT | Performed by: SURGERY

## 2022-07-28 PROCEDURE — 1036F TOBACCO NON-USER: CPT | Performed by: SURGERY

## 2022-07-28 PROCEDURE — G8427 DOCREV CUR MEDS BY ELIG CLIN: HCPCS | Performed by: SURGERY

## 2022-07-28 RX ORDER — FAMOTIDINE 20 MG/1
20 TABLET, FILM COATED ORAL 2 TIMES DAILY
Qty: 60 TABLET | Refills: 3 | Status: SHIPPED | OUTPATIENT
Start: 2022-07-28

## 2022-08-03 NOTE — PROGRESS NOTES
[address] [facility] INVASIVE BARIATRIC SURG  [address] [place] [address] [place]  [address]  [facility] [address]  Dept: [phone]    SURGICAL WEIGHT LOSS MANAGEMENT PROGRAM  PROGRESS NOTE     CC: Weight Loss    Patient: Rama Chaudhari      Service Date: 7/28/2022  Visit:    Follow up  Medical Record #: [id number]  Date of Surgery:   1/21/2019    Reason for Visit: Routine Post-Operative:  [] New Problem /   [] FU of existing problem    Here for follow up for weight loss. She is 100 lbs down. She would like further weight loss. She is still having GERD. She reports exercising and improved diet, but has not lost further weight. No other new complains. She continues to have GERD with meals. Height: 5' 3\" (1.6 m)  Highest Weight:   392 lbs    Current Visit Weight Information  Weight: 283 lb (128.4 kg)   BMI: Body mass index is 50.13 kg/m². Weight loss since surgery:     109    [Labs to be drawn prior to 1m, 3m, 6m, 12m, 18m, and annual visits]    Exercising?    [] Yes    [x] No       Review of Systems:     General  Negative for: [] Weight Change   [x] Fatigue   [x] Fevers & Chills    [] Appetite change [] Other:    Positive for: [x] Weight Change   [] Fatigue   [] Fevers & Chills    [] Appetite change [] Other:   Cardiac  Negative for: [x] Chest Pain   [x] Difficulty Breathing   [] Leg Cramps [x] Edema of Lower Extremeties    [] Left   [] Right      Positive for: [] Chest Pain   [] Difficulty Breathing   [] Leg Cramps [] Edema of Lower Extremeties    [] Left   [] Right   Pulmonary  Negative for: [x] Shortness of Breath [] Wheeze [x] Cough  [] Calf Pain     Positive for: [] Shortness of Breath [] Wheeze [] Cough  [] Calf Pain   Gastro-Intestinal Negative for: [] Heartburn   [] Reflux   [] Dysphagia   [] Melena   [] BRBPR  [x] Vomiting   [x] Abdominal Pain   [] Diarrhea     [] Constipation  [] Other:     Positive for: [] Heartburn   [] Reflux   [] Dysphagia   [] Melena   [] BRBPR [] Vomiting   [] Abdominal Pain   [] Diarrhea     [] Constipation  [] Other:    Muskuloskeletal Negative for: [] Joint pain   [] Back pain   [] Knee Pain   [x] Muscle weakness [x] Hernia   [] Edema [] Other:     Positive for: [] Joint pain   [] Back pain   [] Knee Pain   [] Muscle weakness [] Hernia   [] Edema [] Other:    Neurologic Negative for: [x] Syncope   [x] Insomnia   [] Being treated for depression  [] Other:     Positive for: [] Syncope   [] Insomnia   [] Being treated for depression  [] Other:    Skin Negative for: [] Wound:   [] Open   [] Draining   [] Incisional     [x] Rash   [x] Hair Loss  [] Other:     Positive for: [] Wound:   [] Open   [] Draining    [] Incisional  [] Rash   [] Hair Loss  [] Other:            Physical Assessment:     /69 (Site: Right Upper Arm, Position: Sitting, Cuff Size: Large Adult)   Pulse 75   Ht 5' 3\" (1.6 m)   Wt 283 lb (128.4 kg)   BMI 50.13 kg/m²   Constitutional:  Vital signs are normal. The patient appears well-developed and well-nourished. HEENT:   Head: Normocephalic. Atraumatic  Eyes: pupils are equal and reactive. No scleral icterus is present. Neck: No mass and no thyromegaly present. Cardiovascular: Normal rate, regular rhythm, S1 normal and S2 normal.  Radial pulses present   Pulmonary/Chest: Effort normal and breath sounds normal. No retractions  Abdominal: Soft. Normal appearance. There is no organomegaly. No tenderness. There is no rigidity, no rebound, no guarding and no Alberts's sign. Musculoskeletal:        Right lower leg: Normal. No tenderness and no edema. Left lower leg: Normal. No tenderness and no edema. Neurological: The patient is alert and oriented. Moving all 4 extremities, sensation grossly intact bilateral  Skin: Skin is warm, dry and intact. Psychiatric: The patient has a normal mood and affect.  Speech is normal and behavior is normal. Judgment and thought content normal. Cognition and memory are normal. Assessment & Plan:      1. Gastroesophageal reflux disease without esophagitis    2. Status post laparoscopic sleeve gastrectomy    3. Morbid obesity with BMI of 50.0-59.9, adult (HCC)              [x] Advance Diet    [x] Daily protein (65-75gm/day)   [x] Take Vitamins   [x] Attend Support Group    [x] Exercise Regularly     GERD  Rx for Pepcid sent  We discuss revision options  Sleep with head of bed elevated    Morbid Obesity, BMI 50  She completed Adipex 3 months ago but is struggling with weight again  Would like to consider a revision  She has GERD, given this likely a gastric bypass  Exercise daily, add resistance training  Regular diet, limit carbohydrates, increase protein sources  Dietician visit    Work up for revision would include:  Psych evaluation   Clearance  Labs  3 months diet and exercise   EGD    Follow up: No follow-ups on file. Orders placed this encounter:   No orders of the defined types were placed in this encounter. New Prescriptions:   Orders Placed This Encounter   Medications    famotidine (PEPCID) 20 MG tablet     Sig: Take 1 tablet by mouth in the morning and 1 tablet before bedtime. Dispense:  60 tablet     Refill:  3        Electronically signed by Amber Patel DO on 8/3/2022 at 7:03 PM    Please note that this chart was generated using voice recognition Dragon dictation software. Although every effort was made to ensure the accuracy of this automated transcription, some errors in transcription may have occurred.

## 2022-09-22 NOTE — TELEPHONE ENCOUNTER
Medication sent.
There has been an outbreak of scabies at patient's work and she is now getting itchy bumps on her skin. She is asking if a medication to treat scabies can be called in for her so she can avoid coming in to the office.
none

## 2022-10-31 ENCOUNTER — TELEPHONE (OUTPATIENT)
Dept: GASTROENTEROLOGY | Age: 47
End: 2022-10-31

## 2022-11-10 ENCOUNTER — TELEMEDICINE (OUTPATIENT)
Dept: PRIMARY CARE CLINIC | Age: 47
End: 2022-11-10
Payer: MEDICARE

## 2022-11-10 DIAGNOSIS — M25.562 ACUTE PAIN OF LEFT KNEE: Primary | ICD-10-CM

## 2022-11-10 PROCEDURE — 99213 OFFICE O/P EST LOW 20 MIN: CPT | Performed by: NURSE PRACTITIONER

## 2022-11-10 PROCEDURE — G8427 DOCREV CUR MEDS BY ELIG CLIN: HCPCS | Performed by: NURSE PRACTITIONER

## 2022-11-10 ASSESSMENT — ENCOUNTER SYMPTOMS: BACK PAIN: 0

## 2022-11-10 NOTE — PROGRESS NOTES
Visit Information    Have you changed or started any medications since your last visit including any over-the-counter medicines, vitamins, or herbal medicines? no   Are you having any side effects from any of your medications? -  no  Have you stopped taking any of your medications? Is so, why? -  no    Have you seen any other physician or provider since your last visit? Yes - Records Obtained  Have you had any other diagnostic tests since your last visit? No  Have you been seen in the emergency room and/or had an admission to a hospital since we last saw you? No  Have you had your routine dental cleaning in the past 6 months? no    Have you activated your [employer] account? If not, what are your barriers?  Yes     Patient Care Team:  XOCHITL Cárdenas CNP as PCP - General (Family Medicine)  XOCHITL [facility]den[facility]  Flip Ayoub MD as Obstetrician (Perinatology)  Seamus Galvan DO as Resident (Obstetrics & Gynecology)  Padmaja Evans MD as Consulting Physician (Pulmonology)  James Noble DO as Consulting Physician (Bariatric Surgery)    Medical History Review  Past Medical, Family, and Social History reviewed and does not contribute to the patient presenting condition    Health Maintenance   Topic Date Due    Hepatitis C screen  Never done    A1C test (Diabetic or Prediabetic)  05/21/2019    COVID-19 Vaccine (3 - Booster) 06/17/2021    Colorectal Cancer Screen  Never done    Flu vaccine (1) 08/01/2022    Lipids  05/21/2023    Depression Screen  07/19/2023    Cervical cancer screen  06/29/2026    DTaP/Tdap/Td vaccine (2 - Td or Tdap) 01/09/2029    HIV screen  Completed    Hepatitis A vaccine  Aged Out    Hib vaccine  Aged Out    Meningococcal (ACWY) vaccine  Aged Out    Pneumococcal 0-64 years Vaccine  Aged Out

## 2022-11-10 NOTE — PROGRESS NOTES
Maranda Villarreal is a 41 y.o. female evaluated virtual visit via ncyclo on 11/10/2022. Consent:  She and/or health care decision maker is aware that that she may receive a bill for this telephone service, depending on her insurance coverage, and has provided verbal consent to proceed: Yes      Documentation:  I communicated with the patient and/or health care decision maker about knee pain. Details of this discussion including any medical advice provided below      I affirm this is a Patient Initiated Episode with an Established Patient who has not had a related appointment within my department in the past 7 days or scheduled within the next 24 hours. Total Time: minutes: 11-20 minutes    Note: not billable if this call serves to triage the patient into an appointment for the relevant concern      XOCHITL Andujar CNP                  11/10/2022    TELEHEALTH EVALUATION -- Audio/Visual (During [id number] public health emergency)    Patient and physician are located in their individual homes    HPI:    Maranda Villarreal (:  1975) has requested an audio only/video evaluation for the following concern(s):        Having knee pain over the last month, slowly worsening. Worse after she's on her feet for a long time, 12 hour shifts  She states the knee swells off and on, denies any obvious redness but often just feels warm    History significant for degenerative hip disease since age of 17 with bilateral hip replacements. Review of Systems   Musculoskeletal:  Positive for arthralgias and gait problem. Negative for back pain and joint swelling. Prior to Visit Medications    Medication Sig Taking? Authorizing Provider   famotidine (PEPCID) 20 MG tablet Take 1 tablet by mouth in the morning and 1 tablet before bedtime.  Yes Michael Sees, DO   Elastic Bandages & Supports (JOBST ANTI-EM KNEE LENGTH LG) MISC Wear daily while ambulating Yes XOCHITL Andujar CNP   Calcium Citrate-Vitamin D (CALCIUM CITRATE + D3 PO) Take by mouth Yes Historical Provider, MD   Multiple Vitamins-Minerals (THERAPEUTIC MULTIVITAMIN-MINERALS) tablet Take 1 tablet by mouth daily Yes Historical Provider, MD       Social History     Tobacco Use    Smoking status: Former     Packs/day: 1.00     Years: 5.00     Pack years: 5.00     Types: Cigars, Cigarettes     Start date: 82     Quit date: 2013     Years since quittin.2    Smokeless tobacco: Never   Vaping Use    Vaping Use: Never used   Substance Use Topics    Alcohol use: No    Drug use: No        Past Medical History:   Diagnosis Date    Anemia 2014    Chronic back pain     Diabetes mellitus type 2 in obese ([facility] [address]     On Metformin    GERD (gastroesophageal reflux disease)     Hip pain, chronic     Bilateral, bilat surgery as child    Hyperlipidemia 2015    Morbid obesity ([facility]     ZAID (obstructive sleep apnea)     CPAP nightly    Osteoarthritis     Syncope     Trichomonas contact, treated     Urinary incontinence             CBC:  Lab Results   Component Value Date/Time    WBC 10.3 2019 05:25 AM    HGB 12.0 2019 05:25 AM     2019 05:25 AM       BMP:    Lab Results   Component Value Date/Time     2019 05:25 AM    K 4.2 2019 05:25 AM    CL 95 2019 05:25 AM    CO2 26 2019 05:25 AM    BUN 4 2019 05:25 AM    CREATININE 0.65 2019 05:25 AM    GLUCOSE 121 2019 05:25 AM       HEMOGLOBIN A1C:   Lab Results   Component Value Date/Time    LABA1C 5.9 2018 03:53 PM       FASTING LIPID PANEL:  Lab Results   Component Value Date    CHOL 187 2018    HDL 51 2018    TRIG 90 2018         RECORD REVIEW: Previous medical records were reviewed at today's visit. PHYSICAL EXAMINATION:    Vital Signs: (As obtained by patient/caregiver at home)    No flowsheet data found. Physical Exam  Constitutional:       Appearance: Normal appearance. She is obese.  She is not ill-appearing or toxic-appearing. HENT:      Head: Normocephalic. Right Ear: External ear normal.      Left Ear: External ear normal.      Nose: Nose normal.      Mouth/Throat:      Mouth: Mucous membranes are moist.   Eyes:      General: No scleral icterus. Right eye: No discharge. Left eye: No discharge. Conjunctiva/sclera: Conjunctivae normal.   Pulmonary:      Effort: Pulmonary effort is normal.   Musculoskeletal:      Cervical back: Normal range of motion. Skin:     Coloration: Skin is not jaundiced. Neurological:      Mental Status: She is alert and oriented to person, place, and time. Psychiatric:         Mood and Affect: Mood normal.         Behavior: Behavior normal.         Thought Content: Thought content normal.           RECORD REVIEW: Previous medical records were reviewed at today's visit. The past family, medical and social histories were reviewed and unchanged with the exceptions of what is mentioned in this note. Due to this being a TeleHealth encounter, evaluation of the following organ systems is limited: Vitals/Constitutional/EENT/Resp/CV/GI//MS/Neuro/Skin/Heme-Lymph-Imm. ASSESSMENT/PLAN:  Kwabena Schofield was seen today for check-up. Diagnoses and all orders for this visit:    Acute pain of left knee  -     XR KNEE LEFT (3 VIEWS); Future    Agreed start with x-ray of the knee, possible OA given morbid obesity and history of degenerative hip disease    Return for Follow up after labs resulted. An  electronic signature was used to authenticate this note. --XOCHITL John - CNP on 11/10/2022 at 3:43 PM    This note is created with the assistance of a speech-recognition program. While intending to generate a document that actually reflects the content of the visit, the document can still have some mistakes which may not have been identified and corrected by editing. Jeffery Mcarthur, was evaluated through a synchronous (real-time) audio-video encounter. The patient (or guardian if applicable) is aware that this is a billable service, which includes applicable co-pays. This Virtual Visit was conducted with patient's (and/or legal guardian's) consent. The visit was conducted pursuant to the emergency declaration under the [address] [place] [address] authority and the [employer] Act. Patient identification was verified, and a caregiver was present when appropriate. The patient was located in a state where the provider was licensed to provide care. Services were provided through a video synchronous discussion virtually to substitute for in-person clinic visit.

## 2022-11-15 ENCOUNTER — OFFICE VISIT (OUTPATIENT)
Dept: BARIATRICS/WEIGHT MGMT | Age: 47
End: 2022-11-15
Payer: MEDICARE

## 2022-11-15 VITALS
RESPIRATION RATE: 20 BRPM | DIASTOLIC BLOOD PRESSURE: 72 MMHG | SYSTOLIC BLOOD PRESSURE: 126 MMHG | HEART RATE: 72 BPM | HEIGHT: 63 IN | WEIGHT: 292 LBS | BODY MASS INDEX: 51.74 KG/M2

## 2022-11-15 DIAGNOSIS — R55 NEUROCARDIOGENIC SYNCOPE: ICD-10-CM

## 2022-11-15 DIAGNOSIS — Z98.84 STATUS POST LAPAROSCOPIC SLEEVE GASTRECTOMY: ICD-10-CM

## 2022-11-15 DIAGNOSIS — E66.01 MORBID OBESITY (HCC): ICD-10-CM

## 2022-11-15 DIAGNOSIS — D50.9 IRON DEFICIENCY ANEMIA, UNSPECIFIED IRON DEFICIENCY ANEMIA TYPE: Primary | ICD-10-CM

## 2022-11-15 DIAGNOSIS — G89.29 CHRONIC PAIN OF LEFT KNEE: ICD-10-CM

## 2022-11-15 DIAGNOSIS — M25.562 CHRONIC PAIN OF LEFT KNEE: ICD-10-CM

## 2022-11-15 DIAGNOSIS — K21.9 GASTROESOPHAGEAL REFLUX DISEASE, UNSPECIFIED WHETHER ESOPHAGITIS PRESENT: ICD-10-CM

## 2022-11-15 PROCEDURE — G8427 DOCREV CUR MEDS BY ELIG CLIN: HCPCS | Performed by: NURSE PRACTITIONER

## 2022-11-15 PROCEDURE — 1036F TOBACCO NON-USER: CPT | Performed by: NURSE PRACTITIONER

## 2022-11-15 PROCEDURE — 99213 OFFICE O/P EST LOW 20 MIN: CPT | Performed by: NURSE PRACTITIONER

## 2022-11-15 PROCEDURE — G8417 CALC BMI ABV UP PARAM F/U: HCPCS | Performed by: NURSE PRACTITIONER

## 2022-11-15 PROCEDURE — G8484 FLU IMMUNIZE NO ADMIN: HCPCS | Performed by: NURSE PRACTITIONER

## 2022-11-15 NOTE — PROGRESS NOTES
Medical Weight Management Progress Note    Subjective     Patient being seen for medically supervised weight loss for the chronic conditions of ALEXEI, Neurocardiogenic Syncope, GERD, Chronic Pain Left Knee. She is working on the behavior changes discussed at the initial appointment. Patient continues on diet plan. Physical activity includes walking. Weight today is 292 lbs. Needs to schedule psych eval.  EGD scheduled 12/2/22. No current issues. Working toward bariatric surgery:    [] Sleeve Gastrectomy                                                           [] Lucas-en-Y Gastric Bypass                [x] Revision    Allergies: Allergies   Allergen Reactions    Motrin [Ibuprofen]      Pt states that it causes her stomach to bleed    Shellfish-Derived Products Hives and Itching       Past Medical History:     Past Medical History:   Diagnosis Date    Anemia 9/18/2014    Chronic back pain     Diabetes mellitus type 2 in obese (HCC)     On Metformin    GERD (gastroesophageal reflux disease)     Hip pain, chronic     Bilateral, bilat surgery as child    Hyperlipidemia 1/20/2015    Morbid obesity (HCC)     ZAID (obstructive sleep apnea)     CPAP nightly    Osteoarthritis     Syncope     Trichomonas contact, treated     Urinary incontinence    .     Past Surgical History:  Past Surgical History:   Procedure Laterality Date    DENTAL SURGERY  01/15/2019    with local at dental office    HIP SURGERY Bilateral     at age13 with pins/rods Bilateral    SLEEVE GASTRECTOMY  01/21/2019     ROBOTIC GASTRECTOMY SLEEVE LAPAROSCOPIC, LIVER BIOPSY, EGD    SLEEVE GASTRECTOMY N/A 1/21/2019    XI ROBOTIC GASTRECTOMY SLEEVE LAPAROSCOPIC, EGD performed by Aayush Yap DO at [address]  2018       Family History:  Family History   Problem Relation Age of Onset    High Blood Pressure Father     Diabetes Mother     Hypertension Mother     Arthritis Mother     Depression Mother     High Cholesterol Mother     Kidney Disease Mother         soon to have dislysis    Diabetes Maternal Uncle     Diabetes Maternal Aunt     High Blood Pressure Maternal Aunt     High Cholesterol Brother     Schizophrenia Brother     No Known Problems Maternal Grandmother     High Blood Pressure Maternal Grandfather     Diabetes Paternal Grandmother     Heart Failure Paternal Grandmother     Heart Failure Paternal Uncle     Diabetes Paternal Aunt     High Blood Pressure Paternal Aunt     Kidney Disease Paternal Aunt         Dialysis    Diabetes Maternal Uncle     Other Daughter         Hip problem    Diabetes Daughter         on Metformin       Social History:  Social History     Socioeconomic History    Marital status:      Spouse name: Evans Gold    Number of children: 6    Years of education: Not on file    Highest education level: Not on file   Occupational History    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   Tobacco Use    Smoking status: Former     Packs/day: 1.00     Years: 5.00     Pack years: 5.00     Types: Cigars, Cigarettes     Start date:      Quit date: 2013     Years since quittin.2    Smokeless tobacco: Never   Vaping Use    Vaping Use: Never used   Substance and Sexual Activity    Alcohol use: No    Drug use: No    Sexual activity: Yes     Partners: Male   Other Topics Concern    Not on file   Social History Narrative    Not on file     Social Determinants of Health     Financial Resource Strain: Low Risk     Difficulty of Paying Living Expenses: Not hard at all   Food Insecurity: No Food Insecurity    Worried About Running Out of Food in the Last Year: Never true    [address] in the Last Year: Never true   Transportation Needs: No Transportation Needs    Lack of Transportation (Medical): No    Lack of Transportation (Non-Medical):  No   Physical Activity: Not on file   Stress: Not on file   Social Connections: Not on file   Intimate Partner Violence: Not on file   Housing Stability: Not on file       Current Medications:  Current Outpatient Medications   Medication Sig Dispense Refill    famotidine (PEPCID) 20 MG tablet Take 1 tablet by mouth in the morning and 1 tablet before bedtime. 60 tablet 3    Elastic Bandages & Supports (JOBST ANTI-EM KNEE LENGTH LG) MISC Wear daily while ambulating 2 each 1    Calcium Citrate-Vitamin D (CALCIUM CITRATE + D3 PO) Take by mouth      Multiple Vitamins-Minerals (THERAPEUTIC MULTIVITAMIN-MINERALS) tablet Take 1 tablet by mouth daily       No current facility-administered medications for this visit. Vital Signs:  /72 (Site: Right Upper Arm, Position: Sitting, Cuff Size: Large Adult)   Pulse 72   Resp 20   Ht 5' 3\" (1.6 m)   Wt 292 lb (132.5 kg)   LMP  (LMP Unknown)   BMI 51.73 kg/m²     BMI/Height/Weight:  Body mass index is 51.73 kg/m². Review of Systems - A review of systems was performed. All was negative unless otherwise documented in HPI. Constitutional: Negative for fever, chills. HENT: Negative for trouble swallowing. Eyes: Negative for visual disturbance. Respiratory: Negative for cough, shortness of breath. Cardiovascular: Negative for chest pain and palpitations. Gastrointestinal: Negative for nausea, vomiting, abdominal pain, diarrhea, constipation, blood in stool and abdominal distention. Endocrine: Negative for polydipsia, polyphagia and polyuria. Genitourinary: Negative for dysuria, frequency, hematuria and difficulty urinating. Musculoskeletal: Negative for myalgias, joint swelling. Skin: Negative for pallor and rash. Neurological: Negative for dizziness, tremors, light-headedness and headaches. Psychiatric/Behavioral: Negative for sleep disturbance and dysphoric mood. Objective:      Physical Exam   Vital signs reviewed. General: Well-developed and well-nourished. No acute distress. Skin: Warm, dry and intact. HEENT: Normocephalic. EOMs intact.  Conjunctivae normal. Neck supple. Cardiovascular: Normal rate, regular rhythm. Pulmonary/Chest: Normal effort. Lungs clear to auscultation. No rales, rhonchi or wheezing. Abdominal: Positive bowel sounds. Soft, nontender. Nondistended. Musculoskeletal: Movement x4. No edema. Neurological: Gait normal. Alert and oriented to person, place, and time. Psychiatric: Normal mood and affect. Speech and behavior normal. Judgment and thought content normal. Cognition and memory intact. Assessment:       Diagnosis Orders   1. Iron deficiency anemia, unspecified iron deficiency anemia type  Comprehensive Metabolic Panel    TSH    T4, Free    Hemoglobin A1C    Vitamin B12 & Folate    Vitamin B1    Vitamin D 25 Hydroxy    Magnesium    Iron and TIBC    Vitamin A    Lipid Panel    PTH, Intact    CBC with Auto Differential    Zinc    Ferritin    Urine Drug Screen    Nicotine, Blood      2. Neurocardiogenic syncope  Comprehensive Metabolic Panel    TSH    T4, Free    Hemoglobin A1C    Vitamin B12 & Folate    Vitamin B1    Vitamin D 25 Hydroxy    Magnesium    Iron and TIBC    Vitamin A    Lipid Panel    PTH, Intact    CBC with Auto Differential    Zinc    Ferritin    Urine Drug Screen    Nicotine, Blood      3. Gastroesophageal reflux disease, unspecified whether esophagitis present  Comprehensive Metabolic Panel    TSH    T4, Free    Hemoglobin A1C    Vitamin B12 & Folate    Vitamin B1    Vitamin D 25 Hydroxy    Magnesium    Iron and TIBC    Vitamin A    Lipid Panel    PTH, Intact    CBC with Auto Differential    Zinc    Ferritin    Urine Drug Screen    Nicotine, Blood      4. Status post laparoscopic sleeve gastrectomy  Comprehensive Metabolic Panel    TSH    T4, Free    Hemoglobin A1C    Vitamin B12 & Folate    Vitamin B1    Vitamin D 25 Hydroxy    Magnesium    Iron and TIBC    Vitamin A    Lipid Panel    PTH, Intact    CBC with Auto Differential    Zinc    Ferritin    Urine Drug Screen    Nicotine, Blood      5.  Chronic pain of left knee  Comprehensive Metabolic Panel    TSH    T4, Free    Hemoglobin A1C    Vitamin B12 & Folate    Vitamin B1    Vitamin D 25 Hydroxy    Magnesium    Iron and TIBC    Vitamin A    Lipid Panel    PTH, Intact    CBC with Auto Differential    Zinc    Ferritin    Urine Drug Screen    Nicotine, Blood      6. Morbid obesity (HCC)  Comprehensive Metabolic Panel    TSH    T4, Free    Hemoglobin A1C    Vitamin B12 & Folate    Vitamin B1    Vitamin D 25 Hydroxy    Magnesium    Iron and TIBC    Vitamin A    Lipid Panel    PTH, Intact    CBC with Auto Differential    Zinc    Ferritin    Urine Drug Screen    Nicotine, Blood          Plan:    Dietitian visit today. Patient was encouraged to journal all food intake. Keep calorie level at approximately 0675-3151. Protein intake is to be a minimum of 60-80 grams per day. Water drinking was encouraged with a goal of 64oz-128oz daily. Beverages to be calorie free except for milk. Every other beverage should be water. Avoid soda. Continue to increase level of physical activity. Encouraged use of exercise log. Patients will undergo thorough nutritional evaluation and counseling with our registered dietician. Our program provides long term nutritional counseling with unlimited consults with the dietician. All female patients have been educated on the importance of using reliable birth control and avoiding pregnancy the first 18 months - 2 years following bariatric surgery. All female patient will have a pregnancy test done with the pre-admission testing. All patients are nicotine tested and require a negative nicotine level prior to surgery. Patient has been educated about the risks associated with substance use, as well as the risks of using nicotine and alcohol after surgery. Patient has been educated that theses substances need to be avoided lifelong after surgery to reduce the risk of complications and sub-optimal weight loss.     Bariatric pre-op labs ordered. Follow-up  Return in about 1 month (around 12/15/2022). Orders this encounter:  Orders Placed This Encounter   Procedures    Comprehensive Metabolic Panel     Standing Status:   Future     Standing Expiration Date:   11/15/2023    TSH     Standing Status:   Future     Standing Expiration Date:   11/15/2023    T4, Free     Standing Status:   Future     Standing Expiration Date:   11/15/2023    Hemoglobin A1C     Standing Status:   Future     Standing Expiration Date:   11/15/2023    Vitamin B12 & Folate     Standing Status:   Future     Standing Expiration Date:   11/15/2023    Vitamin B1     Standing Status:   Future     Standing Expiration Date:   11/15/2023    Vitamin D 25 Hydroxy     Standing Status:   Future     Standing Expiration Date:   11/15/2023    Magnesium     Standing Status:   Future     Standing Expiration Date:   11/15/2023    Iron and TIBC     Standing Status:   Future     Standing Expiration Date:   11/15/2023     Order Specific Question:   Is Patient Fasting? Answer:   Yes     Order Specific Question:   No of Hours?      Answer:   12 hours    Vitamin A     Standing Status:   Future     Standing Expiration Date:   11/15/2023    Lipid Panel     Standing Status:   Future     Standing Expiration Date:   11/15/2023     Order Specific Question:   Is Patient Fasting?/# of Hours     Answer:   12 hrs    PTH, Intact     Standing Status:   Future     Standing Expiration Date:   11/15/2023    CBC with Auto Differential     Standing Status:   Future     Standing Expiration Date:   11/15/2023    Zinc     Standing Status:   Future     Standing Expiration Date:   11/15/2023    Ferritin     Standing Status:   Future     Standing Expiration Date:   11/15/2023    Urine Drug Screen     Standing Status:   Future     Standing Expiration Date:   11/15/2023    Nicotine, Blood     Standing Status:   Future     Standing Expiration Date:   11/15/2023       Prescriptions this encounter:  No orders of the defined types were placed in this encounter.       Electronically signed by:  Sandra Orta CNP

## 2022-11-15 NOTE — PROGRESS NOTES
Medical Nutrition Therapy   Metabolic and Bariatric Surgery         Supervised diet and exercise preparation sleeve to bypass  Visit 1 out of 3  Pt reports:  provided nutrition section and exercise logs    Changes in eating patterns to promote health are noted below on the goals number 19-22    Vitals: Wt Readings from Last 3 Encounters:   11/15/22 292 lb (132.5 kg)   07/28/22 283 lb (128.4 kg)   07/19/22 281 lb 12.8 oz (127.8 kg)         Nutrition Assessment:   PES: Knowledge deficit related to healthy behaviors that support weight management post weight loss surgery as evidenced by Body mass index is 51.73 kg/m². Nutrition Assessment of Goal Attainment:  TREATMENT GOALS:    1. Pt  Completed  out of  goals. 2.TREATMENT GOALS FOR UPCOMING WEEK: continue all previous goals and add: #     All goals were planned with and agreed on by the patient. I will follow bariatric behaviors. I will exercise log. Questions asked for goal understanding:                                                  Do you understand your goals? Do you have the information you need to achieve your goals? Do you have any questions  right now? []  Consistent goal achievement in the program thus far and further success with goals is expected. []  Unable to consistently make progress in goal achievement. At this time patient is not moving forward  in developing the skills needed for success after surgery. Plan:    Continue to follow monthly and review goals.          [x]  Nutrition visits complete    []

## 2022-11-29 ENCOUNTER — ANESTHESIA EVENT (OUTPATIENT)
Dept: OPERATING ROOM | Age: 47
End: 2022-11-29
Payer: MEDICARE

## 2022-12-02 ENCOUNTER — HOSPITAL ENCOUNTER (OUTPATIENT)
Age: 47
Setting detail: OUTPATIENT SURGERY
Discharge: HOME | End: 2022-12-02
Attending: SURGERY | Admitting: SURGERY
Payer: MEDICARE

## 2022-12-02 ENCOUNTER — ANESTHESIA (OUTPATIENT)
Dept: OPERATING ROOM | Age: 47
End: 2022-12-02
Payer: MEDICARE

## 2022-12-02 VITALS
RESPIRATION RATE: 21 BRPM | BODY MASS INDEX: 51.23 KG/M2 | SYSTOLIC BLOOD PRESSURE: 132 MMHG | HEART RATE: 70 BPM | DIASTOLIC BLOOD PRESSURE: 89 MMHG | OXYGEN SATURATION: 98 % | WEIGHT: 289.13 LBS | HEIGHT: 63 IN | TEMPERATURE: 96.9 F

## 2022-12-02 DIAGNOSIS — K21.9 GASTROESOPHAGEAL REFLUX DISEASE WITHOUT ESOPHAGITIS: ICD-10-CM

## 2022-12-02 DIAGNOSIS — E66.01 MORBID OBESITY (HCC): ICD-10-CM

## 2022-12-02 LAB — HCG, PREGNANCY URINE (POC): NEGATIVE

## 2022-12-02 PROCEDURE — 2580000003 HC RX 258: Performed by: ANESTHESIOLOGY

## 2022-12-02 PROCEDURE — 7100000011 HC PHASE II RECOVERY - ADDTL 15 MIN: Performed by: SURGERY

## 2022-12-02 PROCEDURE — 3700000000 HC ANESTHESIA ATTENDED CARE: Performed by: SURGERY

## 2022-12-02 PROCEDURE — 81025 URINE PREGNANCY TEST: CPT

## 2022-12-02 PROCEDURE — 88305 TISSUE EXAM BY PATHOLOGIST: CPT

## 2022-12-02 PROCEDURE — 2709999900 HC NON-CHARGEABLE SUPPLY: Performed by: SURGERY

## 2022-12-02 PROCEDURE — 3609012400 HC EGD TRANSORAL BIOPSY SINGLE/MULTIPLE: Performed by: SURGERY

## 2022-12-02 PROCEDURE — 6360000002 HC RX W HCPCS: Performed by: NURSE ANESTHETIST, CERTIFIED REGISTERED

## 2022-12-02 PROCEDURE — 2500000003 HC RX 250 WO HCPCS: Performed by: NURSE ANESTHETIST, CERTIFIED REGISTERED

## 2022-12-02 PROCEDURE — 7100000010 HC PHASE II RECOVERY - FIRST 15 MIN: Performed by: SURGERY

## 2022-12-02 RX ORDER — MORPHINE SULFATE 2 MG/ML
1 INJECTION, SOLUTION INTRAMUSCULAR; INTRAVENOUS EVERY 5 MIN PRN
Status: DISCONTINUED | OUTPATIENT
Start: 2022-12-02 | End: 2022-12-02 | Stop reason: HOSPADM

## 2022-12-02 RX ORDER — SODIUM CHLORIDE, SODIUM LACTATE, POTASSIUM CHLORIDE, CALCIUM CHLORIDE 600; 310; 30; 20 MG/100ML; MG/100ML; MG/100ML; MG/100ML
INJECTION, SOLUTION INTRAVENOUS CONTINUOUS
Status: DISCONTINUED | OUTPATIENT
Start: 2022-12-02 | End: 2022-12-02 | Stop reason: HOSPADM

## 2022-12-02 RX ORDER — DIPHENHYDRAMINE HYDROCHLORIDE 50 MG/ML
12.5 INJECTION INTRAMUSCULAR; INTRAVENOUS
Status: DISCONTINUED | OUTPATIENT
Start: 2022-12-02 | End: 2022-12-02 | Stop reason: HOSPADM

## 2022-12-02 RX ORDER — LIDOCAINE HYDROCHLORIDE 10 MG/ML
INJECTION, SOLUTION INFILTRATION; PERINEURAL PRN
Status: DISCONTINUED | OUTPATIENT
Start: 2022-12-02 | End: 2022-12-02 | Stop reason: SDUPTHER

## 2022-12-02 RX ORDER — ONDANSETRON 2 MG/ML
4 INJECTION INTRAMUSCULAR; INTRAVENOUS
Status: DISCONTINUED | OUTPATIENT
Start: 2022-12-02 | End: 2022-12-02 | Stop reason: HOSPADM

## 2022-12-02 RX ORDER — SODIUM CHLORIDE 0.9 % (FLUSH) 0.9 %
5-40 SYRINGE (ML) INJECTION PRN
Status: DISCONTINUED | OUTPATIENT
Start: 2022-12-02 | End: 2022-12-02 | Stop reason: HOSPADM

## 2022-12-02 RX ORDER — SODIUM CHLORIDE 0.9 % (FLUSH) 0.9 %
5-40 SYRINGE (ML) INJECTION EVERY 12 HOURS SCHEDULED
Status: DISCONTINUED | OUTPATIENT
Start: 2022-12-02 | End: 2022-12-02 | Stop reason: HOSPADM

## 2022-12-02 RX ORDER — PROPOFOL 10 MG/ML
INJECTION, EMULSION INTRAVENOUS PRN
Status: DISCONTINUED | OUTPATIENT
Start: 2022-12-02 | End: 2022-12-02 | Stop reason: SDUPTHER

## 2022-12-02 RX ORDER — MEPERIDINE HYDROCHLORIDE 50 MG/ML
12.5 INJECTION INTRAMUSCULAR; INTRAVENOUS; SUBCUTANEOUS ONCE
Status: DISCONTINUED | OUTPATIENT
Start: 2022-12-02 | End: 2022-12-02 | Stop reason: HOSPADM

## 2022-12-02 RX ORDER — SODIUM CHLORIDE 9 MG/ML
INJECTION, SOLUTION INTRAVENOUS CONTINUOUS
Status: DISCONTINUED | OUTPATIENT
Start: 2022-12-02 | End: 2022-12-02 | Stop reason: HOSPADM

## 2022-12-02 RX ORDER — SODIUM CHLORIDE 9 MG/ML
25 INJECTION, SOLUTION INTRAVENOUS PRN
Status: DISCONTINUED | OUTPATIENT
Start: 2022-12-02 | End: 2022-12-02 | Stop reason: HOSPADM

## 2022-12-02 RX ORDER — SODIUM CHLORIDE 9 MG/ML
INJECTION, SOLUTION INTRAVENOUS PRN
Status: DISCONTINUED | OUTPATIENT
Start: 2022-12-02 | End: 2022-12-02 | Stop reason: HOSPADM

## 2022-12-02 RX ADMIN — LIDOCAINE HYDROCHLORIDE 80 MG: 10 INJECTION, SOLUTION INFILTRATION; PERINEURAL at 09:03

## 2022-12-02 RX ADMIN — PROPOFOL 30 MG: 10 INJECTION, EMULSION INTRAVENOUS at 09:06

## 2022-12-02 RX ADMIN — SODIUM CHLORIDE: 9 INJECTION, SOLUTION INTRAVENOUS at 08:52

## 2022-12-02 RX ADMIN — PROPOFOL 150 MG: 10 INJECTION, EMULSION INTRAVENOUS at 09:03

## 2022-12-02 RX ADMIN — PROPOFOL 50 MG: 10 INJECTION, EMULSION INTRAVENOUS at 09:04

## 2022-12-02 ASSESSMENT — PAIN - FUNCTIONAL ASSESSMENT: PAIN_FUNCTIONAL_ASSESSMENT: 0-10

## 2022-12-02 NOTE — DISCHARGE INSTRUCTIONS
POST-ENDOSCOPY INSTRUCTIONS    1. ACTIVITY   No driving, operating machinery, or making important decisions for 24 hours. Resume normal activity after 24 hours. You may return to work after 24 hours. 2. DIET    EGD: Resume your usual diet unless specified below. Diet Modification: none    3. MEDICATIONS  (Do not consume alcohol, tranquilizers, or sleeping medications for 24 hours unless advised by your physician)                 Resume your usual medications    4. PHYSICIAN FOLLOW-UP                 Please continue with your previously scheduled appointments                 See your primary care physician as planned. 6. NORMAL CHANGES YOU MAY EXPERIENCE AFTER ENDOSCOPY:      EGD:  Sore throat, some abdominal pain and cramping. 7. CALL YOUR PHYSICIAN IF YOU EXPERIENCE ANY OF THE FOLLOWING      A. Passing blood rectally or vomiting blood (color may be red or black)      B. Severe abdominal pain or tenderness (that is not relieved by passing air)      C.   Fever, chills, or excessive sweating      D.  Persistent nausea or vomiting      E.  Redness or swelling at the IV site    If you have additional questions, PLEASE call your doctor or the [facility] center at [phone]

## 2022-12-02 NOTE — H&P
Subjective     The patient is a 41 y.o. female who is here to undergo EGD for GERD. There is no height or weight on file to calculate BMI. They are considering a bariatric procedure for morbid obesity. Past Medical History:   Diagnosis Date    Anemia 9/18/2014    Chronic back pain     Diabetes mellitus type 2 in obese [facility])     On Metformin    GERD (gastroesophageal reflux disease)     Hip pain, chronic     Bilateral, bilat surgery as child    Hyperlipidemia 1/20/2015    Morbid obesity (HCC)     ZAID (obstructive sleep apnea)     CPAP nightly    Osteoarthritis     Syncope     Trichomonas contact, treated     Urinary incontinence    . Review of Systems - A complete 14 point review of systems was performed. All was negative unless otherwise documented in HPI. Allergies:   Allergies   Allergen Reactions    Motrin [Ibuprofen]      Pt states that it causes her stomach to bleed    Shellfish-Derived Products Hives and Itching       Past Surgical History:  Past Surgical History:   Procedure Laterality Date    DENTAL SURGERY  01/15/2019    with local at dental office    HIP SURGERY Bilateral     at age13 with pins/rods Bilateral    SLEEVE GASTRECTOMY  01/21/2019     ROBOTIC GASTRECTOMY SLEEVE LAPAROSCOPIC, LIVER BIOPSY, EGD    SLEEVE GASTRECTOMY N/A 1/21/2019    XI ROBOTIC GASTRECTOMY SLEEVE LAPAROSCOPIC, EGD performed by Roxana Shafer DO at [address] [place] [address]  2018       Family History:  Family History   Problem Relation Age of Onset    High Blood Pressure Father     Diabetes Mother     Hypertension Mother     Arthritis Mother     Depression Mother     High Cholesterol Mother     Kidney Disease Mother         soon to have dislysis    Diabetes Maternal Uncle     Diabetes Maternal Aunt     High Blood Pressure Maternal Aunt     High Cholesterol Brother     Schizophrenia Brother     No Known Problems Maternal Grandmother     High Blood Pressure Maternal Grandfather     Diabetes Paternal Grandmother     Heart Failure Paternal Grandmother     Heart Failure Paternal Uncle     Diabetes Paternal Aunt     High Blood Pressure Paternal Aunt     Kidney Disease Paternal Aunt         Dialysis    Diabetes Maternal Uncle     Other Daughter         Hip problem    Diabetes Daughter         on Metformin       Social History:  Social History     Socioeconomic History    Marital status:      Spouse name: Joe Jaffe    Number of children: 6    Years of education: Not on file    Highest education level: Not on file   Occupational History    Occupation: RN     Comment: [employer]   Tobacco Use    Smoking status: Former     Packs/day: 1.00     Years: 5.00     Pack years: 5.00     Types: Cigars, Cigarettes     Start date:      Quit date: 2013     Years since quittin.3    Smokeless tobacco: Never   Vaping Use    Vaping Use: Never used   Substance and Sexual Activity    Alcohol use: No    Drug use: No    Sexual activity: Yes     Partners: Male   Other Topics Concern    Not on file   Social History Narrative    Not on file     Social Determinants of Health     Financial Resource Strain: Low Risk     Difficulty of Paying Living Expenses: Not hard at all   Food Insecurity: No Food Insecurity    Worried About [address] in the Last Year: Never true    [employer] in the Last Year: Never true   Transportation Needs: No Transportation Needs    Lack of Transportation (Medical): No    Lack of Transportation (Non-Medical): No   Physical Activity: Not on file   Stress: Not on file   Social Connections: Not on file   Intimate Partner Violence: Not on file   Housing Stability: Not on file       No current facility-administered medications for this encounter. Objective      Physical Exam  LMP  (LMP Unknown)    Constitutional:  Vital signs are normal. The patient appears well-developed and well-nourished. HEENT:   Head: Normocephalic.  Atraumatic  Eyes: pupils are equal and reactive. No scleral icterus is present. Neck: No mass and no thyromegaly present. Cardiovascular: Normal rate, regular rhythm, S1 normal and S2 normal.    Pulmonary/Chest: Effort normal and breath sounds normal.   Abdominal: Soft. Normal appearance. There is no organomegaly. No tenderness. There is no rigidity, no rebound, no guarding and no Alberts's sign. Musculoskeletal:        Right lower leg: Normal. No tenderness and no edema. Left lower leg: Normal. No tenderness and no edema. Lymphadenopathy:     No cervical adenopathy, No Exrtemity Adenopathy. Neurological: The patient is alert and oriented. Skin: Skin is warm, dry and intact. Psychiatric: The patient has a normal mood and affect.  Speech is normal and behavior is normal. Judgment and thought content normal. Cognition and memory are normal.     Assessment     Patient Active Problem List   Diagnosis    Neurocardiogenic syncope    Anemia    Numbness and tingling in both hands    Hip pain    Vitamin D deficiency    Gallbladder calculus without cholecystitis    Chronic pain of left knee    Status post laparoscopic sleeve gastrectomy    Obesity, Class III, BMI 40-49.9 (morbid obesity) (HCC)    GERD (gastroesophageal reflux disease)    Morbid obesity ([address]       Plan   EGD

## 2022-12-02 NOTE — ANESTHESIA POSTPROCEDURE EVALUATION
POST- ANESTHESIA EVALUATION       Pt Name: Rhiannon Palencia  MRN: [id number]  [place]: 1975  Date of evaluation: 12/2/2022  Time:  10:05 AM      /89   Pulse 70   Temp 96.9 °F (36.1 °C) (Temporal)   Resp 21   Ht 5' 3\" (1.6 m)   Wt 289 lb 2 oz (131.1 kg)   LMP  (LMP Unknown)   SpO2 98%   BMI 51.22 kg/m²      Consciousness Level  Awake  Cardiopulmonary Status  Stable  Pain Adequately Treated YES  Nausea / Vomiting  NO  Adequate Hydration  YES  Anesthesia Related Complications NONE      Electronically signed by Joselin Robison MD on 12/2/2022 at 10:05 AM       Department of Anesthesiology  Postprocedure Note    Patient: Rhiannon Palencia  MRN: [id number]  [place]: 1975  Date of evaluation: 12/2/2022      Procedure Summary     Date: 12/02/22 Room / Location: [place] [address] / [facility]    Anesthesia Start: 0900 Anesthesia Stop: 4885    Procedure: EGD BIOPSY Diagnosis:       Gastroesophageal reflux disease without esophagitis      Morbid obesity ([address]      (gerd obesity)    Surgeons: Flavio Beltran DO Responsible Provider: Joselin Robison MD    Anesthesia Type: MAC ASA Status: 3          Anesthesia Type: No value filed.     Oneal Phase I: Oneal Score: 10    Oneal Phase II: Oneal Score: 10      Anesthesia Post Evaluation

## 2022-12-02 NOTE — OP NOTE
[facility] [place]  [address] [facility] [address]  Dept: [phone]  Loc: [phone]    Preoperative Diagnosis:    GERD  Morbid obesity, BMI of Body mass index is 51.22 kg/m². Postoperative Diagnosis:   GERD with mild distal esophagitis  Sleeve Anatomy, mildly dilated, no retained fundus  Morbid obesity, BMI of Body mass index is 51.22 kg/m². Procedure: Esophagogastroduodenoscopy with Biopsy    Surgeon: Naomie Novak DO    Assistant:    Anesthesia: MAC, see anesthesia records    Specimen:    1) Antrum for H. Pylori      Findings: dilated sleeve, possible small sliding hiatal hernia    EBL: NONE    Operative Narrative: The risks and benefits were explained in detail to the patient who agreed and consented to the procedure. The patient was taken to the endoscopic suite and placed in a lateral position. Oxygen was administered via nasal cannula and a mouth guard was placed. MAC was administered via the anesthetic team.      The endoscope was then advanced into the oropharynx and down into the esophagus under direct visualization. The scope was further advanced through the esophagus, GE junction and stomach to the pylorus under visualization. The scope was passed through the pylorus and duodenal sweep performed, advancing and visualizing to the second portion of the duodenum. The scope was then withdrawn to the antrum and cold forceps were used to take biopsies of the antrum for H. Pylori. Appropriate hemostasis was noted. The scope was then retroflexed to visualize the GE junction. Evidence of a hiatal hernia was noted. The scope was then slowly withdrawn through the GE junction. The Z line was noted. The stomach was then decompressed. The scope was withdrawn from the esophagus and no further lesions noted. The scope was withdrawn. The patient tolerated the procedure well.           She will follow up with the [facility] for further assessment.

## 2022-12-02 NOTE — ANESTHESIA PRE PROCEDURE
Department of Anesthesiology  Preprocedure Note       Name:  Bere Buck   Age:  41 y.o.  :  1975                                          MRN:  [id number]         Date:  2022      Surgeon: Franklin Soto):  Christain Santiago DO    Procedure: Procedure(s):  EGD BIOPSY    Medications prior to admission:   Prior to Admission medications    Medication Sig Start Date End Date Taking? Authorizing Provider   famotidine (PEPCID) 20 MG tablet Take 1 tablet by mouth in the morning and 1 tablet before bedtime. 22   Christian Santiago DO   Elastic Bandages & Supports (JOBST ANTI-EM KNEE LENGTH LG) MISC Wear daily while ambulating 22   XOCHITL Shukla - CNP   Calcium Citrate-Vitamin D (CALCIUM CITRATE + D3 PO) Take by mouth    Historical Provider, MD   Multiple Vitamins-Minerals (THERAPEUTIC MULTIVITAMIN-MINERALS) tablet Take 1 tablet by mouth daily    Historical Provider, MD       Current medications:    No current facility-administered medications for this encounter. Allergies:     Allergies   Allergen Reactions    Motrin [Ibuprofen]      Pt states that it causes her stomach to bleed    Shellfish-Derived Products Hives and Itching       Problem List:    Patient Active Problem List   Diagnosis Code    Neurocardiogenic syncope R55    Anemia D64.9    Numbness and tingling in both hands R20.0, R20.2    Hip pain M25.559    Vitamin D deficiency E55.9    Gallbladder calculus without cholecystitis K80.20    Chronic pain of left knee M25.562, G89.29    Status post laparoscopic sleeve gastrectomy Z98.84    Obesity, Class III, BMI 40-49.9 (morbid obesity) (formerly [facility]) E66.01    GERD (gastroesophageal reflux disease) K21.9    Morbid obesity ([facility]) E66.01       Past Medical History:        Diagnosis Date    Anemia 2014    Chronic back pain     Diabetes mellitus type 2 in obese ([facility])     On Metformin    GERD (gastroesophageal reflux disease)     Hip pain, chronic     Bilateral, bilat surgery as child    Hyperlipidemia 2015    Morbid obesity ([facility]     ZAID (obstructive sleep apnea)     CPAP nightly    Osteoarthritis     Syncope     Trichomonas contact, treated     Urinary incontinence        Past Surgical History:        Procedure Laterality Date    DENTAL SURGERY  01/15/2019    with local at dental office    HIP SURGERY Bilateral     at age15 with pins/rods Bilateral    SLEEVE GASTRECTOMY  2019     ROBOTIC GASTRECTOMY SLEEVE LAPAROSCOPIC, LIVER BIOPSY, EGD    SLEEVE GASTRECTOMY N/A 2019    XI ROBOTIC GASTRECTOMY SLEEVE LAPAROSCOPIC, EGD performed by Amber Morales DO at [address]  2018       Social History:    Social History     Tobacco Use    Smoking status: Former     Packs/day: 1.00     Years: 5.00     Pack years: 5.00     Types: Cigars, Cigarettes     Start date: 82     Quit date: 2013     Years since quittin.3    Smokeless tobacco: Never   Substance Use Topics    Alcohol use: No                                Counseling given: Not Answered      Vital Signs (Current): There were no vitals filed for this visit.                                            BP Readings from Last 3 Encounters:   11/15/22 126/72   22 122/69   22 127/76       NPO Status:                                                                                 BMI:   Wt Readings from Last 3 Encounters:   11/15/22 292 lb (132.5 kg)   22 283 lb (128.4 kg)   22 281 lb 12.8 oz (127.8 kg)     There is no height or weight on file to calculate BMI.    CBC:   Lab Results   Component Value Date/Time    WBC 10.3 2019 05:25 AM    RBC 4.31 2019 05:25 AM    HGB 12.0 2019 05:25 AM    HCT 38.7 2019 05:25 AM    MCV 89.8 2019 05:25 AM    RDW 13.1 2019 05:25 AM     2019 05:25 AM       CMP:   Lab Results   Component Value Date/Time     2019 05:25 AM    K 4.2 2019 05:25 AM    CL 95 01/22/2019 05:25 AM    CO2 26 01/22/2019 05:25 AM    BUN 4 01/22/2019 05:25 AM    CREATININE 0.65 01/22/2019 05:25 AM    GFRAA >60 01/22/2019 05:25 AM    LABGLOM >60 01/22/2019 05:25 AM    GLUCOSE 121 01/22/2019 05:25 AM    PROT 7.8 05/21/2018 03:53 PM    CALCIUM 8.7 01/22/2019 05:25 AM    BILITOT 0.36 05/21/2018 03:53 PM    ALKPHOS 86 01/09/2019 03:55 PM    AST 55 05/21/2018 03:53 PM    ALT 24 01/09/2019 03:55 PM       POC Tests: No results for input(s): POCGLU, POCNA, POCK, POCCL, POCBUN, POCHEMO, POCHCT in the last 72 hours. Coags:   Lab Results   Component Value Date/Time    PROTIME 10.1 01/09/2019 03:55 PM    INR 0.9 01/09/2019 03:55 PM    APTT 26.6 01/09/2019 03:55 PM       HCG (If Applicable): Negative 27/6/49  Lab Results   Component Value Date    PREGTESTUR Negative 08/22/2018    HCG NEGATIVE 01/21/2019        ABGs: No results found for: PHART, PO2ART, FEY5CTT, FYT2IIO, BEART, F4DVBSLR     Type & Screen (If Applicable):  No results found for: LABABO, LABRH    Drug/Infectious Status (If Applicable):  No results found for: HIV, HEPCAB    COVID-19 Screening (If Applicable): No results found for: COVID19        Anesthesia Evaluation  Patient summary reviewed and Nursing notes reviewed no history of anesthetic complications:   Airway: Mallampati: II  TM distance: >3 FB   Neck ROM: full  Mouth opening: > = 3 FB   Dental: normal exam         Pulmonary:normal exam  breath sounds clear to auscultation  (+) sleep apnea: on CPAP,                             Cardiovascular:Negative CV ROS            Rhythm: regular  Rate: normal                    Neuro/Psych:   Negative Neuro/Psych ROS               ROS comment: Numbness and tingling in both hands GI/Hepatic/Renal:   (+) GERD:, morbid obesity          Endo/Other:    (+) DiabetesType II DM, no interval change, , : arthritis:., .                 Abdominal:   (+) obese,     Abdomen: soft. Vascular: negative vascular ROS.          Other Findings: Anesthesia Plan      MAC     ASA 3             Anesthetic plan and risks discussed with patient. Plan discussed with CRNA.                     Chapincito Oswald MD   12/2/2022

## 2022-12-05 LAB — SURGICAL PATHOLOGY REPORT: NORMAL

## 2022-12-09 ENCOUNTER — HOSPITAL ENCOUNTER (OUTPATIENT)
Age: 47
Setting detail: SPECIMEN
Discharge: HOME | End: 2022-12-09

## 2022-12-09 DIAGNOSIS — K21.9 GASTROESOPHAGEAL REFLUX DISEASE, UNSPECIFIED WHETHER ESOPHAGITIS PRESENT: ICD-10-CM

## 2022-12-09 DIAGNOSIS — M25.562 CHRONIC PAIN OF LEFT KNEE: ICD-10-CM

## 2022-12-09 DIAGNOSIS — Z11.59 NEED FOR HEPATITIS C SCREENING TEST: ICD-10-CM

## 2022-12-09 DIAGNOSIS — R55 NEUROCARDIOGENIC SYNCOPE: ICD-10-CM

## 2022-12-09 DIAGNOSIS — E66.01 MORBID OBESITY (HCC): ICD-10-CM

## 2022-12-09 DIAGNOSIS — Z13.1 DIABETES MELLITUS SCREENING: ICD-10-CM

## 2022-12-09 DIAGNOSIS — D50.9 IRON DEFICIENCY ANEMIA, UNSPECIFIED IRON DEFICIENCY ANEMIA TYPE: ICD-10-CM

## 2022-12-09 DIAGNOSIS — G89.29 CHRONIC PAIN OF LEFT KNEE: ICD-10-CM

## 2022-12-09 DIAGNOSIS — Z98.84 STATUS POST LAPAROSCOPIC SLEEVE GASTRECTOMY: ICD-10-CM

## 2022-12-09 LAB
ABSOLUTE EOS #: 0.09 K/UL (ref 0–0.44)
ABSOLUTE IMMATURE GRANULOCYTE: <0.03 K/UL (ref 0–0.3)
ABSOLUTE LYMPH #: 1.37 K/UL (ref 1.1–3.7)
ABSOLUTE MONO #: 0.39 K/UL (ref 0.1–1.2)
ALBUMIN SERPL-MCNC: 4 G/DL (ref 3.5–5.2)
ALBUMIN/GLOBULIN RATIO: 1.1 (ref 1–2.5)
ALP BLD-CCNC: 68 U/L (ref 35–104)
ALT SERPL-CCNC: 15 U/L (ref 5–33)
AMPHETAMINE SCREEN URINE: NEGATIVE
ANION GAP SERPL CALCULATED.3IONS-SCNC: 12 MMOL/L (ref 9–17)
AST SERPL-CCNC: 22 U/L
BARBITURATE SCREEN URINE: NEGATIVE
BASOPHILS # BLD: 2 % (ref 0–2)
BASOPHILS ABSOLUTE: 0.08 K/UL (ref 0–0.2)
BENZODIAZEPINE SCREEN, URINE: NEGATIVE
BILIRUB SERPL-MCNC: 0.3 MG/DL (ref 0.3–1.2)
BUN BLDV-MCNC: 17 MG/DL (ref 6–20)
CALCIUM SERPL-MCNC: 9.2 MG/DL (ref 8.6–10.4)
CANNABINOID SCREEN URINE: NEGATIVE
CHLORIDE BLD-SCNC: 106 MMOL/L (ref 98–107)
CHOLESTEROL/HDL RATIO: 2.3
CHOLESTEROL: 203 MG/DL
CO2: 24 MMOL/L (ref 20–31)
COCAINE METABOLITE, URINE: NEGATIVE
CREAT SERPL-MCNC: 0.86 MG/DL (ref 0.5–0.9)
EOSINOPHILS RELATIVE PERCENT: 2 % (ref 1–4)
ESTIMATED AVERAGE GLUCOSE: 114 MG/DL
ESTIMATED AVERAGE GLUCOSE: 114 MG/DL
FENTANYL URINE: NEGATIVE
FERRITIN: 15 NG/ML (ref 13–150)
FOLATE: >20 NG/ML
GFR SERPL CREATININE-BSD FRML MDRD: >60 ML/MIN/1.73M2
GLUCOSE BLD-MCNC: 87 MG/DL (ref 70–99)
HBA1C MFR BLD: 5.6 % (ref 4–6)
HBA1C MFR BLD: 5.6 % (ref 4–6)
HCT VFR BLD CALC: 42.7 % (ref 36.3–47.1)
HDLC SERPL-MCNC: 90 MG/DL
HEMOGLOBIN: 13 G/DL (ref 11.9–15.1)
HEPATITIS C ANTIBODY: NONREACTIVE
IMMATURE GRANULOCYTES: 0 %
IRON SATURATION: 17 % (ref 20–55)
IRON: 69 UG/DL (ref 37–145)
LDL CHOLESTEROL: 105 MG/DL (ref 0–130)
LYMPHOCYTES # BLD: 26 % (ref 24–43)
MAGNESIUM: 1.9 MG/DL (ref 1.6–2.6)
MCH RBC QN AUTO: 28.6 PG (ref 25.2–33.5)
MCHC RBC AUTO-ENTMCNC: 30.4 G/DL (ref 28.4–34.8)
MCV RBC AUTO: 93.8 FL (ref 82.6–102.9)
METHADONE SCREEN, URINE: NEGATIVE
MONOCYTES # BLD: 8 % (ref 3–12)
NRBC AUTOMATED: 0 PER 100 WBC
OPIATES, URINE: NEGATIVE
OXYCODONE SCREEN URINE: NEGATIVE
PDW BLD-RTO: 15.5 % (ref 11.8–14.4)
PHENCYCLIDINE, URINE: NEGATIVE
PLATELET # BLD: 377 K/UL (ref 138–453)
PMV BLD AUTO: 10.1 FL (ref 8.1–13.5)
POTASSIUM SERPL-SCNC: 4.3 MMOL/L (ref 3.7–5.3)
PTH INTACT: 75.9 PG/ML (ref 14–72)
RBC # BLD: 4.55 M/UL (ref 3.95–5.11)
RBC # BLD: ABNORMAL 10*6/UL
SEG NEUTROPHILS: 62 % (ref 36–65)
SEGMENTED NEUTROPHILS ABSOLUTE COUNT: 3.24 K/UL (ref 1.5–8.1)
SODIUM BLD-SCNC: 142 MMOL/L (ref 135–144)
TEST INFORMATION: NORMAL
THYROXINE, FREE: 1.38 NG/DL (ref 0.93–1.7)
TOTAL IRON BINDING CAPACITY: 406 UG/DL (ref 250–450)
TOTAL PROTEIN: 7.5 G/DL (ref 6.4–8.3)
TRIGL SERPL-MCNC: 42 MG/DL
TSH SERPL DL<=0.05 MIU/L-ACNC: 0.83 UIU/ML (ref 0.3–5)
UNSATURATED IRON BINDING CAPACITY: 337 UG/DL (ref 112–347)
VITAMIN B-12: 823 PG/ML (ref 232–1245)
VITAMIN D 25-HYDROXY: 26.3 NG/ML
WBC # BLD: 5.2 K/UL (ref 3.5–11.3)

## 2022-12-12 DIAGNOSIS — E55.9 VITAMIN D DEFICIENCY: Primary | ICD-10-CM

## 2022-12-12 RX ORDER — ERGOCALCIFEROL 1.25 MG/1
50000 CAPSULE ORAL WEEKLY
Qty: 8 CAPSULE | Refills: 0 | Status: SHIPPED | OUTPATIENT
Start: 2022-12-12 | End: 2023-01-31

## 2022-12-28 ENCOUNTER — OFFICE VISIT (OUTPATIENT)
Dept: BARIATRICS/WEIGHT MGMT | Age: 47
End: 2022-12-28
Payer: MEDICARE

## 2022-12-28 VITALS
RESPIRATION RATE: 20 BRPM | DIASTOLIC BLOOD PRESSURE: 85 MMHG | HEIGHT: 63 IN | SYSTOLIC BLOOD PRESSURE: 139 MMHG | HEART RATE: 99 BPM | WEIGHT: 288 LBS | BODY MASS INDEX: 51.03 KG/M2

## 2022-12-28 DIAGNOSIS — E66.01 MORBID OBESITY (HCC): ICD-10-CM

## 2022-12-28 DIAGNOSIS — Z98.84 STATUS POST LAPAROSCOPIC SLEEVE GASTRECTOMY: ICD-10-CM

## 2022-12-28 DIAGNOSIS — D50.9 IRON DEFICIENCY ANEMIA, UNSPECIFIED IRON DEFICIENCY ANEMIA TYPE: ICD-10-CM

## 2022-12-28 DIAGNOSIS — K21.00 GASTROESOPHAGEAL REFLUX DISEASE WITH ESOPHAGITIS WITHOUT HEMORRHAGE: Primary | ICD-10-CM

## 2022-12-28 DIAGNOSIS — R55 NEUROCARDIOGENIC SYNCOPE: ICD-10-CM

## 2022-12-28 PROCEDURE — 1036F TOBACCO NON-USER: CPT | Performed by: NURSE PRACTITIONER

## 2022-12-28 PROCEDURE — G8417 CALC BMI ABV UP PARAM F/U: HCPCS | Performed by: NURSE PRACTITIONER

## 2022-12-28 PROCEDURE — G8484 FLU IMMUNIZE NO ADMIN: HCPCS | Performed by: NURSE PRACTITIONER

## 2022-12-28 PROCEDURE — 99213 OFFICE O/P EST LOW 20 MIN: CPT | Performed by: NURSE PRACTITIONER

## 2022-12-28 PROCEDURE — G8427 DOCREV CUR MEDS BY ELIG CLIN: HCPCS | Performed by: NURSE PRACTITIONER

## 2022-12-28 RX ORDER — FAMOTIDINE 20 MG/1
20 TABLET, FILM COATED ORAL NIGHTLY
Qty: 30 TABLET | Refills: 3 | Status: SHIPPED | OUTPATIENT
Start: 2022-12-28

## 2022-12-28 RX ORDER — PANTOPRAZOLE SODIUM 40 MG/1
40 TABLET, DELAYED RELEASE ORAL DAILY
Qty: 30 TABLET | Refills: 3 | Status: SHIPPED | OUTPATIENT
Start: 2022-12-28

## 2022-12-28 NOTE — PROGRESS NOTES
Medical Weight Management Progress Note    Subjective     Patient being seen for medically supervised weight loss for the chronic conditions of ALEXEI, Neurocardiogenic Syncope, GERD, Chronic Pain Left Knee. She is working on the behavior changes discussed at the initial appointment. Patient continues on diet plan. Physical activity includes walking and workouts. Weight loss of 4 lbs since last visit. Psych eval completed and pending report. EGD completed and H Pylori negative. Having increased GERD symptoms. No current issues. Working toward bariatric surgery:    [] Sleeve Gastrectomy                                                           [] Lucas-en-Y Gastric Bypass                [x] Revision    Allergies: Allergies   Allergen Reactions    Motrin [Ibuprofen]      Pt states that it causes her stomach to bleed    Shellfish-Derived Products Hives and Itching       Past Medical History:     Past Medical History:   Diagnosis Date    Anemia 9/18/2014    Chronic back pain     Diabetes mellitus type 2 in obese (HCC)     On Metformin    GERD (gastroesophageal reflux disease)     Hip pain, chronic     Bilateral, bilat surgery as child    Hyperlipidemia 1/20/2015    Morbid obesity (HCC)     ZAID (obstructive sleep apnea)     CPAP nightly    Osteoarthritis     Syncope     Trichomonas contact, treated     Urinary incontinence    .     Past Surgical History:  Past Surgical History:   Procedure Laterality Date    DENTAL SURGERY  01/15/2019    with local at dental office    ESOPHAGOGASTRODUODENOSCOPY  12/02/2022    : EGD BIOPSY    HIP SURGERY Bilateral     at age13 with pins/rods Bilateral    SLEEVE GASTRECTOMY  01/21/2019     ROBOTIC GASTRECTOMY SLEEVE LAPAROSCOPIC, LIVER BIOPSY, EGD    SLEEVE GASTRECTOMY N/A 01/21/2019    XI ROBOTIC GASTRECTOMY SLEEVE LAPAROSCOPIC, EGD performed by Allison Leavitt DO at [address] [place] [address]    UPPER GASTROINTESTINAL ENDOSCOPY N/A 12/2/2022    EGD BIOPSY performed by Suzette Gutierrez DO at [facility] OR       Family History:  Family History   Problem Relation Age of Onset    High Blood Pressure Father     Diabetes Mother     Hypertension Mother     Arthritis Mother     Depression Mother     High Cholesterol Mother     Kidney Disease Mother         soon to have dislysis    Diabetes Maternal Uncle     Diabetes Maternal Aunt     High Blood Pressure Maternal Aunt     High Cholesterol Brother     Schizophrenia Brother     No Known Problems Maternal Grandmother     High Blood Pressure Maternal Grandfather     Diabetes Paternal Grandmother     Heart Failure Paternal Grandmother     Heart Failure Paternal Uncle     Diabetes Paternal Aunt     High Blood Pressure Paternal Aunt     Kidney Disease Paternal Aunt         Dialysis    Diabetes Maternal Uncle     Other Daughter         Hip problem    Diabetes Daughter         on Metformin       Social History:  Social History     Socioeconomic History    Marital status:      Spouse name: Yuriy Gates    Number of children: 6    Years of education: Not on file    Highest education level: Not on file   Occupational History    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 [employer]   Tobacco Use    Smoking status: Former     Packs/day: 1.00     Years: 5.00     Pack years: 5.00     Types: Cigars, Cigarettes     Start date:      Quit date: 2013     Years since quittin.4    Smokeless tobacco: Never   Vaping Use    Vaping Use: Never used   Substance and Sexual Activity    Alcohol use: No    Drug use: No    Sexual activity: Yes     Partners: Male   Other Topics Concern    Not on file   Social History Narrative    Not on file     Social Determinants of Health     Financial Resource Strain: Low Risk     Difficulty of Paying Living Expenses: Not hard at all   Food Insecurity: No Food Insecurity    Worried About [address] in the Last Year: Never true    [address] [facility] [address] in the Last Year: Never true Transportation Needs: No Transportation Needs    Lack of Transportation (Medical): No    Lack of Transportation (Non-Medical): No   Physical Activity: Not on file   Stress: Not on file   Social Connections: Not on file   Intimate Partner Violence: Not on file   Housing Stability: Not on file       Current Medications:  Current Outpatient Medications   Medication Sig Dispense Refill    famotidine (PEPCID) 20 MG tablet Take 1 tablet by mouth at bedtime 30 tablet 3    pantoprazole (PROTONIX) 40 MG tablet Take 1 tablet by mouth daily 30 tablet 3    vitamin D (ERGOCALCIFEROL) 1.25 MG (20727 UT) CAPS capsule Take 1 capsule by mouth once a week for 8 doses 8 capsule 0    Elastic Bandages & Supports (JOBST ANTI-EM KNEE LENGTH LG) MISC Wear daily while ambulating 2 each 1    Calcium Citrate-Vitamin D (CALCIUM CITRATE + D3 PO) Take by mouth      Multiple Vitamins-Minerals (THERAPEUTIC MULTIVITAMIN-MINERALS) tablet Take 1 tablet by mouth daily       No current facility-administered medications for this visit. Vital Signs:  /85 (Site: Right Lower Arm, Position: Sitting, Cuff Size: Large Adult)   Pulse 99   Resp 20   Ht 5' 3\" (1.6 m)   Wt 288 lb (130.6 kg)   BMI 51.02 kg/m²     BMI/Height/Weight:  Body mass index is 51.02 kg/m². Review of Systems - A review of systems was performed. All was negative unless otherwise documented in HPI. Constitutional: Negative for fever, chills. HENT: Negative for trouble swallowing. Eyes: Negative for visual disturbance. Respiratory: Negative for cough, shortness of breath. Cardiovascular: Negative for chest pain and palpitations. Gastrointestinal: Negative for nausea, vomiting, abdominal pain, diarrhea, constipation, blood in stool and abdominal distention. Endocrine: Negative for polydipsia, polyphagia and polyuria. Genitourinary: Negative for dysuria, frequency, hematuria and difficulty urinating.    Musculoskeletal: Negative for myalgias, joint swelling. Skin: Negative for pallor and rash. Neurological: Negative for dizziness, tremors, light-headedness and headaches. Psychiatric/Behavioral: Negative for sleep disturbance and dysphoric mood. Objective:      Physical Exam   Vital signs reviewed. General: Well-developed and well-nourished. No acute distress. Skin: Warm, dry and intact. HEENT: Normocephalic. EOMs intact. Conjunctivae normal. Neck supple. Cardiovascular: Normal rate, regular rhythm. Pulmonary/Chest: Normal effort. Lungs clear to auscultation. No rales, rhonchi or wheezing. Abdominal: Positive bowel sounds. Soft, nontender. Nondistended. Musculoskeletal: Movement x4. No edema. Neurological: Gait normal. Alert and oriented to person, place, and time. Psychiatric: Normal mood and affect. Speech and behavior normal. Judgment and thought content normal. Cognition and memory intact. Assessment:       Diagnosis Orders   1. Gastroesophageal reflux disease with esophagitis without hemorrhage  famotidine (PEPCID) 20 MG tablet    pantoprazole (PROTONIX) 40 MG tablet      2. Morbid obesity ([address]        3. Neurocardiogenic syncope        4. Iron deficiency anemia, unspecified iron deficiency anemia type        5. Status post laparoscopic sleeve gastrectomy            Plan:    Dietitian visit today. Patient was encouraged to journal all food intake. Keep calorie level at approximately 0372-5058. Protein intake is to be a minimum of 60-80 grams per day. Water drinking was encouraged with a goal of 64oz-128oz daily. Beverages to be calorie free except for milk. Every other beverage should be water. Avoid soda. Continue to increase level of physical activity. Encouraged use of exercise log. Patients will undergo thorough nutritional evaluation and counseling with our registered dietician. Our program provides long term nutritional counseling with unlimited consults with the dietician.   All female patients have been educated on the importance of using reliable birth control and avoiding pregnancy the first 18 months - 2 years following bariatric surgery. All female patient will have a pregnancy test done with the pre-admission testing. All patients are nicotine tested and require a negative nicotine level prior to surgery. Patient has been educated about the risks associated with substance use, as well as the risks of using nicotine and alcohol after surgery. Patient has been educated that theses substances need to be avoided lifelong after surgery to reduce the risk of complications and sub-optimal weight loss. EGD report reviewed. GERD with esophagitis. H Pylori negative. Change Pepcid to HS. Protonix added for breakthrough GERD. Labs reviewed. Vitamin D low and already treated. Follow-up  Return in about 1 month (around 1/28/2023). Orders this encounter:  No orders of the defined types were placed in this encounter.       Prescriptions this encounter:  Orders Placed This Encounter   Medications    famotidine (PEPCID) 20 MG tablet     Sig: Take 1 tablet by mouth at bedtime     Dispense:  30 tablet     Refill:  3    pantoprazole (PROTONIX) 40 MG tablet     Sig: Take 1 tablet by mouth daily     Dispense:  30 tablet     Refill:  3       Electronically signed by:  Yuliya Patel CNP

## 2023-01-26 ENCOUNTER — TELEPHONE (OUTPATIENT)
Dept: BARIATRICS/WEIGHT MGMT | Age: 48
End: 2023-01-26

## 2023-01-26 ENCOUNTER — OFFICE VISIT (OUTPATIENT)
Dept: BARIATRICS/WEIGHT MGMT | Age: 48
End: 2023-01-26
Payer: MEDICARE

## 2023-01-26 VITALS
HEIGHT: 63 IN | WEIGHT: 293 LBS | BODY MASS INDEX: 51.91 KG/M2 | DIASTOLIC BLOOD PRESSURE: 70 MMHG | SYSTOLIC BLOOD PRESSURE: 120 MMHG | HEART RATE: 80 BPM

## 2023-01-26 DIAGNOSIS — D50.9 IRON DEFICIENCY ANEMIA, UNSPECIFIED IRON DEFICIENCY ANEMIA TYPE: ICD-10-CM

## 2023-01-26 DIAGNOSIS — E66.01 MORBID OBESITY (HCC): ICD-10-CM

## 2023-01-26 DIAGNOSIS — Z98.84 STATUS POST LAPAROSCOPIC SLEEVE GASTRECTOMY: ICD-10-CM

## 2023-01-26 DIAGNOSIS — R55 NEUROCARDIOGENIC SYNCOPE: ICD-10-CM

## 2023-01-26 DIAGNOSIS — K21.9 GASTROESOPHAGEAL REFLUX DISEASE, UNSPECIFIED WHETHER ESOPHAGITIS PRESENT: Primary | ICD-10-CM

## 2023-01-26 PROCEDURE — G8427 DOCREV CUR MEDS BY ELIG CLIN: HCPCS | Performed by: NURSE PRACTITIONER

## 2023-01-26 PROCEDURE — 99213 OFFICE O/P EST LOW 20 MIN: CPT | Performed by: NURSE PRACTITIONER

## 2023-01-26 PROCEDURE — 1036F TOBACCO NON-USER: CPT | Performed by: NURSE PRACTITIONER

## 2023-01-26 PROCEDURE — G8417 CALC BMI ABV UP PARAM F/U: HCPCS | Performed by: NURSE PRACTITIONER

## 2023-01-26 PROCEDURE — G8484 FLU IMMUNIZE NO ADMIN: HCPCS | Performed by: NURSE PRACTITIONER

## 2023-01-26 RX ORDER — NORETHINDRONE ACETATE AND ETHINYL ESTRADIOL, AND FERROUS FUMARATE 1MG-20(24)
KIT ORAL
COMMUNITY
Start: 2022-12-08

## 2023-01-26 NOTE — PROGRESS NOTES
Medical Weight Management Progress Note    Subjective     Patient being seen for medically supervised weight loss for the chronic conditions of ALEXEI, Neurocardiogenic Syncope, GERD, Chronic Pain Left Knee.     She is working on the behavior changes discussed at the initial appointment. Patient continues on diet plan. Physical activity includes Negin.  Weight gain of 7 lbs since last visit. Psych eval completed and pending report.  EGD completed and H Pylori negative.  No current issues.    Working toward bariatric surgery:    [] Sleeve Gastrectomy                                                           [] Lucas-en-Y Gastric Bypass                [x] Revision    Allergies:  Allergies   Allergen Reactions    Motrin [Ibuprofen]      Pt states that it causes her stomach to bleed    Shellfish-Derived Products Hives and Itching       Past Medical History:     Past Medical History:   Diagnosis Date    Anemia 9/18/2014    Chronic back pain     Diabetes mellitus type 2 in obese (HCC)     On Metformin    GERD (gastroesophageal reflux disease)     Hip pain, chronic     Bilateral, bilat surgery as child    Hyperlipidemia 1/20/2015    Morbid obesity (HCC)     ZAID (obstructive sleep apnea)     CPAP nightly    Osteoarthritis     Syncope     Trichomonas contact, treated     Urinary incontinence    .    Past Surgical History:  Past Surgical History:   Procedure Laterality Date    DENTAL SURGERY  01/15/2019    with local at dental office    ESOPHAGOGASTRODUODENOSCOPY  12/02/2022    : EGD BIOPSY    HIP SURGERY Bilateral     at age15 with pins/rods Bilateral    SLEEVE GASTRECTOMY  01/21/2019     ROBOTIC GASTRECTOMY SLEEVE LAPAROSCOPIC, LIVER BIOPSY, EGD    SLEEVE GASTRECTOMY N/A 01/21/2019    XI ROBOTIC GASTRECTOMY SLEEVE LAPAROSCOPIC, EGD performed by Coleman Angel DO at [facility] OR    UPPER GASTROINTESTINAL ENDOSCOPY  2018    UPPER GASTROINTESTINAL ENDOSCOPY N/A 12/2/2022    EGD BIOPSY performed by Coleman Angel DO at  [facility] OR       Family History:  Family History   Problem Relation Age of Onset    High Blood Pressure Father     Diabetes Mother     Hypertension Mother     Arthritis Mother     Depression Mother     High Cholesterol Mother     Kidney Disease Mother         soon to have dislysis    Diabetes Maternal Uncle     Diabetes Maternal Aunt     High Blood Pressure Maternal Aunt     High Cholesterol Brother     Schizophrenia Brother     No Known Problems Maternal Grandmother     High Blood Pressure Maternal Grandfather     Diabetes Paternal Grandmother     Heart Failure Paternal Grandmother     Heart Failure Paternal Uncle     Diabetes Paternal Aunt     High Blood Pressure Paternal Aunt     Kidney Disease Paternal Aunt         Dialysis    Diabetes Maternal Uncle     Other Daughter         Hip problem    Diabetes Daughter         on Metformin       Social History:  Social History     Socioeconomic History    Marital status:      Spouse name: Alex Terrazas    Number of children: 6    Years of education: Not on file    Highest education level: Not on file   Occupational History    Occupation: RN     Comment: [employer]   Tobacco Use    Smoking status: Former     Packs/day: 1.00     Years: 5.00     Pack years: 5.00     Types: Cigars, Cigarettes     Start date:      Quit date: 2013     Years since quittin.4    Smokeless tobacco: Never   Vaping Use    Vaping Use: Never used   Substance and Sexual Activity    Alcohol use: No    Drug use: No    Sexual activity: Yes     Partners: Male   Other Topics Concern    Not on file   Social History Narrative    Not on file     Social Determinants of Health     Financial Resource Strain: Low Risk     Difficulty of Paying Living Expenses: Not hard at all   Food Insecurity: No Food Insecurity    Worried About Running Out of Food in the Last Year: Never true    [address] in the Last Year: Never true   Transportation Needs: No Transportation Needs    Lack of Transportation (Medical): No    Lack of Transportation (Non-Medical): No   Physical Activity: Not on file   Stress: Not on file   Social Connections: Not on file   Intimate Partner Violence: Not on file   Housing Stability: Not on file       Current Medications:  Current Outpatient Medications   Medication Sig Dispense Refill    JENNA 24 FE 1-20 MG-MCG(24) TABS take 1 tablet by mouth once daily      famotidine (PEPCID) 20 MG tablet Take 1 tablet by mouth at bedtime 30 tablet 3    pantoprazole (PROTONIX) 40 MG tablet Take 1 tablet by mouth daily 30 tablet 3    vitamin D (ERGOCALCIFEROL) 1.25 MG (62178 UT) CAPS capsule Take 1 capsule by mouth once a week for 8 doses 8 capsule 0    Elastic Bandages & Supports (JOBST ANTI-EM KNEE LENGTH LG) MISC Wear daily while ambulating 2 each 1    Calcium Citrate-Vitamin D (CALCIUM CITRATE + D3 PO) Take by mouth      Multiple Vitamins-Minerals (THERAPEUTIC MULTIVITAMIN-MINERALS) tablet Take 1 tablet by mouth daily       No current facility-administered medications for this visit. Vital Signs:  /70 (Site: Right Upper Arm, Position: Sitting, Cuff Size: Large Adult)   Pulse 80   Ht 5' 3\" (1.6 m)   Wt 295 lb (133.8 kg)   BMI 52.26 kg/m²     BMI/Height/Weight:  Body mass index is 52.26 kg/m². Review of Systems - A review of systems was performed. All was negative unless otherwise documented in HPI. Constitutional: Negative for fever, chills. HENT: Negative for trouble swallowing. Eyes: Negative for visual disturbance. Respiratory: Negative for cough, shortness of breath. Cardiovascular: Negative for chest pain and palpitations. Gastrointestinal: Negative for nausea, vomiting, abdominal pain, diarrhea, constipation, blood in stool and abdominal distention. Endocrine: Negative for polydipsia, polyphagia and polyuria. Genitourinary: Negative for dysuria, frequency, hematuria and difficulty urinating.    Musculoskeletal: Negative for myalgias, joint swelling. Skin: Negative for pallor and rash. Neurological: Negative for dizziness, tremors, light-headedness and headaches. Psychiatric/Behavioral: Negative for sleep disturbance and dysphoric mood. Objective:      Physical Exam   Vital signs reviewed. General: Well-developed and well-nourished. No acute distress. Skin: Warm, dry and intact. HEENT: Normocephalic. EOMs intact. Conjunctivae normal. Neck supple. Cardiovascular: Normal rate, regular rhythm. Pulmonary/Chest: Normal effort. Lungs clear to auscultation. No rales, rhonchi or wheezing. Abdominal: Positive bowel sounds. Soft, nontender. Nondistended. Musculoskeletal: Movement x4. No edema. Neurological: Gait normal. Alert and oriented to person, place, and time. Psychiatric: Normal mood and affect. Speech and behavior normal. Judgment and thought content normal. Cognition and memory intact. Assessment:       Diagnosis Orders   1. Gastroesophageal reflux disease, unspecified whether esophagitis present        2. Morbid obesity ([address]        3. Neurocardiogenic syncope        4. Iron deficiency anemia, unspecified iron deficiency anemia type        5. Status post laparoscopic sleeve gastrectomy            Plan:    Dietitian visit today. Patient was encouraged to journal all food intake. Keep calorie level at approximately 9048-0045. Protein intake is to be a minimum of 60-80 grams per day. Water drinking was encouraged with a goal of 64oz-128oz daily. Beverages to be calorie free except for milk. Every other beverage should be water. Avoid soda. Continue to increase level of physical activity. Encouraged use of exercise log. Patients will undergo thorough nutritional evaluation and counseling with our registered dietician. Our program provides long term nutritional counseling with unlimited consults with the dietician.   All female patients have been educated on the importance of using reliable birth control and avoiding pregnancy the first 18 months - 2 years following bariatric surgery. All female patient will have a pregnancy test done with the pre-admission testing. All patients are nicotine tested and require a negative nicotine level prior to surgery. Patient has been educated about the risks associated with substance use, as well as the risks of using nicotine and alcohol after surgery. Patient has been educated that theses substances need to be avoided lifelong after surgery to reduce the risk of complications and sub-optimal weight loss. Follow-up  Return in about 1 month (around 2/26/2023). Orders this encounter:  No orders of the defined types were placed in this encounter. Prescriptions this encounter:  No orders of the defined types were placed in this encounter.       Electronically signed by:  Onel Yuan CNP

## 2023-01-26 NOTE — TELEPHONE ENCOUNTER
Called Dr Edwin Chiang office for progress on getting psych clearance for surgery, left a detailed message on vm

## 2023-01-27 NOTE — PROGRESS NOTES
Medical Nutrition Therapy   Metabolic and Bariatric Surgery         Supervised diet and exercise preparation  Visit 3 out of 3    Vitals: Wt Readings from Last 3 Encounters:   01/26/23 295 lb (133.8 kg)   12/28/22 288 lb (130.6 kg)   12/02/22 289 lb 2 oz (131.1 kg)         Nutrition Assessment:   PES: Knowledge deficit related to healthy behaviors that support weight management post weight loss surgery as evidenced by Body mass index is 52.26 kg/m². Nutrition Assessment of Goal Attainment:  TREATMENT GOALS:    1. Pt  Completed 2 out of 2 goals. Follow bariatric behaviors. Log exercise daily. [x]  Consistent goal achievement in the program thus far and further success with goals is expected. []  Unable to consistently make progress in goal achievement. At this time patient is not moving forward  in developing the skills needed for success after surgery. Plan:    Continue to follow monthly and review goals.          []  Nutrition visits complete    [x]

## 2023-03-23 ENCOUNTER — OFFICE VISIT (OUTPATIENT)
Dept: BARIATRICS/WEIGHT MGMT | Age: 48
End: 2023-03-23
Payer: MEDICAID

## 2023-03-23 VITALS
DIASTOLIC BLOOD PRESSURE: 72 MMHG | WEIGHT: 292 LBS | SYSTOLIC BLOOD PRESSURE: 111 MMHG | BODY MASS INDEX: 51.74 KG/M2 | HEART RATE: 67 BPM | HEIGHT: 63 IN

## 2023-03-23 DIAGNOSIS — D50.9 IRON DEFICIENCY ANEMIA, UNSPECIFIED IRON DEFICIENCY ANEMIA TYPE: ICD-10-CM

## 2023-03-23 DIAGNOSIS — Z98.84 STATUS POST LAPAROSCOPIC SLEEVE GASTRECTOMY: ICD-10-CM

## 2023-03-23 DIAGNOSIS — E66.01 MORBID OBESITY WITH BMI OF 50.0-59.9, ADULT (HCC): ICD-10-CM

## 2023-03-23 DIAGNOSIS — K21.9 GASTROESOPHAGEAL REFLUX DISEASE, UNSPECIFIED WHETHER ESOPHAGITIS PRESENT: Primary | ICD-10-CM

## 2023-03-23 DIAGNOSIS — R55 NEUROCARDIOGENIC SYNCOPE: ICD-10-CM

## 2023-03-23 PROCEDURE — 99213 OFFICE O/P EST LOW 20 MIN: CPT | Performed by: NURSE PRACTITIONER

## 2023-03-23 NOTE — PROGRESS NOTES
Medical Nutrition Therapy   Metabolic and Bariatric Surgery         Supervised diet and exercise preparation  Pt has completed nutrition goals      Pt reports:  She has recently started doing Negin classes. What additional goals would you like to plan today? I will drink 64 oz of fluid per day. Vitals: Wt Readings from Last 3 Encounters:   03/23/23 292 lb (132.5 kg)   01/26/23 295 lb (133.8 kg)   12/28/22 288 lb (130.6 kg)         Nutrition Assessment:   PES: Knowledge deficit related to healthy behaviors that support weight management post weight loss surgery as evidenced by Body mass index is 51.73 kg/m². Nutrition Assessment of Goal Attainment:  TREATMENT GOALS:        Continue to follow bariatric behaviors. Continue to exercise daily. Continue to follow monthly and review goals.          [x]  Nutrition goals complete    [x]

## 2023-03-23 NOTE — PROGRESS NOTES
Medical Weight Management Progress Note    Subjective     Patient being seen for medically supervised weight loss for the chronic conditions of ALEXEI, Neurocardiogenic Syncope, GERD, Chronic Pain Left Knee. She is working on the behavior changes discussed at the initial appointment. Patient continues on diet plan. Physical activity includes Negin. Weight loss of 3 lbs since last visit. Psych eval completed and pending report. EGD completed and H Pylori negative. No current issues. Working toward bariatric surgery:    [] Sleeve Gastrectomy                                                           [] Lucas-en-Y Gastric Bypass                [x] Revision    Allergies: Allergies   Allergen Reactions    Motrin [Ibuprofen]      Pt states that it causes her stomach to bleed    Shellfish-Derived Products Hives and Itching       Past Medical History:     Past Medical History:   Diagnosis Date    Anemia 9/18/2014    Chronic back pain     Diabetes mellitus type 2 in obese (HCC)     On Metformin    GERD (gastroesophageal reflux disease)     Hip pain, chronic     Bilateral, bilat surgery as child    Hyperlipidemia 1/20/2015    Morbid obesity (HCC)     ZAID (obstructive sleep apnea)     CPAP nightly    Osteoarthritis     Syncope     Trichomonas contact, treated     Urinary incontinence    .     Past Surgical History:  Past Surgical History:   Procedure Laterality Date    DENTAL SURGERY  01/15/2019    with local at dental office    ESOPHAGOGASTRODUODENOSCOPY  12/02/2022    : EGD BIOPSY    HIP SURGERY Bilateral     at age15 with pins/rods Bilateral    SLEEVE GASTRECTOMY  01/21/2019     ROBOTIC GASTRECTOMY SLEEVE LAPAROSCOPIC, LIVER BIOPSY, EGD    SLEEVE GASTRECTOMY N/A 01/21/2019    XI ROBOTIC GASTRECTOMY SLEEVE LAPAROSCOPIC, EGD performed by Jovanny Villa DO at [address] [place] [address]  2018    UPPER GASTROINTESTINAL ENDOSCOPY N/A 12/2/2022    EGD BIOPSY performed by Jovanny Villa DO at

## 2023-04-26 ENCOUNTER — OFFICE VISIT (OUTPATIENT)
Dept: PRIMARY CARE CLINIC | Age: 48
End: 2023-04-26
Payer: MEDICAID

## 2023-04-26 VITALS
HEART RATE: 69 BPM | SYSTOLIC BLOOD PRESSURE: 127 MMHG | DIASTOLIC BLOOD PRESSURE: 73 MMHG | WEIGHT: 292 LBS | OXYGEN SATURATION: 100 % | BODY MASS INDEX: 51.73 KG/M2

## 2023-04-26 DIAGNOSIS — F41.9 ANXIETY: ICD-10-CM

## 2023-04-26 DIAGNOSIS — N81.9 FEMALE GENITAL PROLAPSE, UNSPECIFIED TYPE: Primary | ICD-10-CM

## 2023-04-26 DIAGNOSIS — N39.46 MIXED URGE AND STRESS INCONTINENCE: ICD-10-CM

## 2023-04-26 PROCEDURE — 99214 OFFICE O/P EST MOD 30 MIN: CPT | Performed by: NURSE PRACTITIONER

## 2023-04-26 RX ORDER — BUSPIRONE HYDROCHLORIDE 10 MG/1
TABLET ORAL
Qty: 60 TABLET | Refills: 1 | Status: SHIPPED | OUTPATIENT
Start: 2023-04-26 | End: 2023-05-26

## 2023-04-26 SDOH — ECONOMIC STABILITY: FOOD INSECURITY: WITHIN THE PAST 12 MONTHS, YOU WORRIED THAT YOUR FOOD WOULD RUN OUT BEFORE YOU GOT MONEY TO BUY MORE.: NEVER TRUE

## 2023-04-26 SDOH — ECONOMIC STABILITY: HOUSING INSECURITY
IN THE LAST 12 MONTHS, WAS THERE A TIME WHEN YOU DID NOT HAVE A STEADY PLACE TO SLEEP OR SLEPT IN A SHELTER (INCLUDING NOW)?: NO

## 2023-04-26 SDOH — ECONOMIC STABILITY: INCOME INSECURITY: HOW HARD IS IT FOR YOU TO PAY FOR THE VERY BASICS LIKE FOOD, HOUSING, MEDICAL CARE, AND HEATING?: NOT HARD AT ALL

## 2023-04-26 SDOH — ECONOMIC STABILITY: FOOD INSECURITY: WITHIN THE PAST 12 MONTHS, THE FOOD YOU BOUGHT JUST DIDN'T LAST AND YOU DIDN'T HAVE MONEY TO GET MORE.: NEVER TRUE

## 2023-04-26 ASSESSMENT — PATIENT HEALTH QUESTIONNAIRE - PHQ9
6. FEELING BAD ABOUT YOURSELF - OR THAT YOU ARE A FAILURE OR HAVE LET YOURSELF OR YOUR FAMILY DOWN: 0
9. THOUGHTS THAT YOU WOULD BE BETTER OFF DEAD, OR OF HURTING YOURSELF: 0
SUM OF ALL RESPONSES TO PHQ QUESTIONS 1-9: 3
SUM OF ALL RESPONSES TO PHQ QUESTIONS 1-9: 3
10. IF YOU CHECKED OFF ANY PROBLEMS, HOW DIFFICULT HAVE THESE PROBLEMS MADE IT FOR YOU TO DO YOUR WORK, TAKE CARE OF THINGS AT HOME, OR GET ALONG WITH OTHER PEOPLE: 0
SUM OF ALL RESPONSES TO PHQ9 QUESTIONS 1 & 2: 3
4. FEELING TIRED OR HAVING LITTLE ENERGY: 0
1. LITTLE INTEREST OR PLEASURE IN DOING THINGS: 0
SUM OF ALL RESPONSES TO PHQ QUESTIONS 1-9: 3
7. TROUBLE CONCENTRATING ON THINGS, SUCH AS READING THE NEWSPAPER OR WATCHING TELEVISION: 0
5. POOR APPETITE OR OVEREATING: 0
8. MOVING OR SPEAKING SO SLOWLY THAT OTHER PEOPLE COULD HAVE NOTICED. OR THE OPPOSITE, BEING SO FIGETY OR RESTLESS THAT YOU HAVE BEEN MOVING AROUND A LOT MORE THAN USUAL: 0
SUM OF ALL RESPONSES TO PHQ QUESTIONS 1-9: 3
2. FEELING DOWN, DEPRESSED OR HOPELESS: 3
3. TROUBLE FALLING OR STAYING ASLEEP: 0

## 2023-04-26 ASSESSMENT — ENCOUNTER SYMPTOMS
VOMITING: 0
ABDOMINAL PAIN: 0
BLOOD IN STOOL: 0
DIARRHEA: 0
TROUBLE SWALLOWING: 0
SHORTNESS OF BREATH: 0
WHEEZING: 0

## 2023-04-26 NOTE — PROGRESS NOTES
[facility] PRIMARY CARE  [id number] [address]  Dept: [phone]  Dept Fax: [phone]    Annie Boyd (:  1975) is a 41 y.o. female,Established patient, here for evaluation of the following chief complaint(s): Other (Patient is here for surgical clearance.)         ASSESSMENT/PLAN:  1. Female genital prolapse, unspecified type  -     Gavin Samuels MD, OB/GYN, [address] [place] [address],4[address] Floor Physical Therapy - [place]  2. Mixed urge and stress incontinence  -     [facility] Physical Therapy - [place]  3. Anxiety  -     busPIRone (BUSPAR) 10 MG tablet; Take 0.5 tablets by mouth 2 times daily for 4 days, THEN 1 tablet 2 times daily for 26 days. , Disp-60 tablet, [id number] - [address] In Psych ([address] [facility] In/Family Medicine)    Discussed options for anxiety management, agreed to start BuSpar as well as referral to behavioral health for anxiety management. Patient with symptoms of stress and urge incontinence as well as suspicion for genital prolapse. Did encourage pelvic floor therapy as well as evaluation with gynecology, patient agreeable to [facility]. Discussed may need further evaluation with urogynecology  We will send letter of necessity to bariatrics for gastric sleeve revision    Return in about 6 weeks (around 2023) for Anxiety. Subjective   SUBJECTIVE/OBJECTIVE:  Pt needs letter of medical necessity for revision of gastric sleeve. Struggling again with cravings and weight loss. Denies excessive portions, frequent snacking  Goes to gym an hour three days a week. Does well since her hip surgery    Gastric Sleeve in 2019, also had Adipex at one time. HX of ZAID, DM. Worried about pelvic organ prolapse. Having urinary frequency and feels as if something is \"falling out\". Reports she had some organ prolapse after she had her 4th child. + urinary

## 2023-04-27 ENCOUNTER — OFFICE VISIT (OUTPATIENT)
Dept: BARIATRICS/WEIGHT MGMT | Age: 48
End: 2023-04-27
Payer: MEDICAID

## 2023-04-27 VITALS
BODY MASS INDEX: 51.56 KG/M2 | DIASTOLIC BLOOD PRESSURE: 80 MMHG | HEIGHT: 63 IN | WEIGHT: 291 LBS | SYSTOLIC BLOOD PRESSURE: 124 MMHG | HEART RATE: 60 BPM

## 2023-04-27 DIAGNOSIS — K21.9 GASTROESOPHAGEAL REFLUX DISEASE, UNSPECIFIED WHETHER ESOPHAGITIS PRESENT: ICD-10-CM

## 2023-04-27 DIAGNOSIS — Z98.84 STATUS POST LAPAROSCOPIC SLEEVE GASTRECTOMY: ICD-10-CM

## 2023-04-27 DIAGNOSIS — R55 NEUROCARDIOGENIC SYNCOPE: ICD-10-CM

## 2023-04-27 DIAGNOSIS — E66.01 MORBID OBESITY WITH BMI OF 50.0-59.9, ADULT (HCC): ICD-10-CM

## 2023-04-27 DIAGNOSIS — D50.9 IRON DEFICIENCY ANEMIA, UNSPECIFIED IRON DEFICIENCY ANEMIA TYPE: Primary | ICD-10-CM

## 2023-04-27 PROCEDURE — 99213 OFFICE O/P EST LOW 20 MIN: CPT | Performed by: NURSE PRACTITIONER

## 2023-04-27 NOTE — PROGRESS NOTES
Medical Weight Management Progress Note    Subjective     Patient being seen for medically supervised weight loss for the chronic conditions of ALEXEI, Neurocardiogenic Syncope, GERD, Chronic Pain Left Knee. She is working on the behavior changes discussed at the initial appointment. Patient continues on diet plan. Physical activity includes Negin. Weight loss of 1 lb since last visit. Psych eval completed and pending report. EGD completed and H Pylori negative. No current issues. Working toward bariatric surgery:    [] Sleeve Gastrectomy                                                           [] Lucas-en-Y Gastric Bypass                [x] Revision    Allergies: Allergies   Allergen Reactions    Motrin [Ibuprofen]      Pt states that it causes her stomach to bleed    Shellfish-Derived Products Hives and Itching       Past Medical History:     Past Medical History:   Diagnosis Date    Anemia 9/18/2014    Chronic back pain     Diabetes mellitus type 2 in obese (HCC)     On Metformin    GERD (gastroesophageal reflux disease)     Hip pain, chronic     Bilateral, bilat surgery as child    Hyperlipidemia 1/20/2015    Morbid obesity (HCC)     ZAID (obstructive sleep apnea)     CPAP nightly    Osteoarthritis     Syncope     Trichomonas contact, treated     Urinary incontinence    .     Past Surgical History:  Past Surgical History:   Procedure Laterality Date    DENTAL SURGERY  01/15/2019    with local at dental office    ESOPHAGOGASTRODUODENOSCOPY  12/02/2022    : EGD BIOPSY    HIP SURGERY Bilateral     at age13 with pins/rods Bilateral    SLEEVE GASTRECTOMY  01/21/2019     ROBOTIC GASTRECTOMY SLEEVE LAPAROSCOPIC, LIVER BIOPSY, EGD    SLEEVE GASTRECTOMY N/A 01/21/2019    XI ROBOTIC GASTRECTOMY SLEEVE LAPAROSCOPIC, EGD performed by Theron Woody DO at [address] [facility]  2018    UPPER GASTROINTESTINAL ENDOSCOPY N/A 12/2/2022    [facility]GD [facility]

## 2023-05-26 ENCOUNTER — OFFICE VISIT (OUTPATIENT)
Dept: BARIATRICS/WEIGHT MGMT | Age: 48
End: 2023-05-26
Payer: MEDICAID

## 2023-05-26 VITALS
HEART RATE: 70 BPM | WEIGHT: 292 LBS | SYSTOLIC BLOOD PRESSURE: 110 MMHG | HEIGHT: 63 IN | DIASTOLIC BLOOD PRESSURE: 80 MMHG | BODY MASS INDEX: 51.74 KG/M2

## 2023-05-26 DIAGNOSIS — R55 NEUROCARDIOGENIC SYNCOPE: ICD-10-CM

## 2023-05-26 DIAGNOSIS — K21.9 GASTROESOPHAGEAL REFLUX DISEASE, UNSPECIFIED WHETHER ESOPHAGITIS PRESENT: Primary | ICD-10-CM

## 2023-05-26 DIAGNOSIS — Z98.84 STATUS POST LAPAROSCOPIC SLEEVE GASTRECTOMY: ICD-10-CM

## 2023-05-26 DIAGNOSIS — E66.01 MORBID OBESITY WITH BMI OF 50.0-59.9, ADULT (HCC): ICD-10-CM

## 2023-05-26 PROCEDURE — 99213 OFFICE O/P EST LOW 20 MIN: CPT | Performed by: NURSE PRACTITIONER

## 2023-05-26 NOTE — PROGRESS NOTES
Medical Nutrition Therapy   Metabolic and Bariatric Surgery         Supervised Diet and Exercise Preparation  Nutrition Goals Complete      Pt reports:  wanting to focus on drinks  What additional goals would you like to plan today? Continue to aim to drink more water and find balance between hunger and boredom. Vitals: Wt Readings from Last 3 Encounters:   05/26/23 292 lb (132.5 kg)   04/27/23 291 lb (132 kg)   04/26/23 292 lb (132.5 kg)         Nutrition Assessment:   PES: Knowledge deficit related to healthy behaviors that support weight management post weight loss surgery as evidenced by Body mass index is 51.73 kg/m². Nutrition Assessment of Goal Attainment:  TREATMENT GOALS:      Continue bariatric behaviors. Continue to exercise. Continue to follow monthly and review goals.          [x]  Nutrition goals complete    [x]

## 2023-05-26 NOTE — PROGRESS NOTES
Medical Weight Management Progress Note    Subjective     Patient being seen for medically supervised weight loss for the chronic conditions of ALEXEI, Neurocardiogenic Syncope, GERD, Chronic Pain Left Knee. She had a sleeve gastrectomy in 2019 and is interested in revision surgery due to weight plateau and GERD. She is working on the behavior changes discussed at the initial appointment. Patient continues on diet plan. Physical activity includes Negin. Weight gain of 1 lb since last visit. Psych eval completed and pending report. EGD completed and H Pylori negative. No current issues. Working toward bariatric surgery:    [] Sleeve Gastrectomy                                                           [] Lucas-en-Y Gastric Bypass                [x] Revision    Allergies: Allergies   Allergen Reactions    Motrin [Ibuprofen]      Pt states that it causes her stomach to bleed    Shellfish-Derived Products Hives and Itching       Past Medical History:     Past Medical History:   Diagnosis Date    Anemia 9/18/2014    Chronic back pain     Diabetes mellitus type 2 in obese (HCC)     On Metformin    GERD (gastroesophageal reflux disease)     Hip pain, chronic     Bilateral, bilat surgery as child    Hyperlipidemia 1/20/2015    Morbid obesity (HCC)     ZAID (obstructive sleep apnea)     CPAP nightly    Osteoarthritis     Syncope     Trichomonas contact, treated     Urinary incontinence    .     Past Surgical History:  Past Surgical History:   Procedure Laterality Date    DENTAL SURGERY  01/15/2019    with local at dental office    ESOPHAGOGASTRODUODENOSCOPY  12/02/2022    : EGD BIOPSY    HIP SURGERY Bilateral     at age15 with pins/rods Bilateral    SLEEVE GASTRECTOMY  01/21/2019     ROBOTIC GASTRECTOMY SLEEVE LAPAROSCOPIC, LIVER BIOPSY, EGD    SLEEVE GASTRECTOMY N/A 01/21/2019    XI ROBOTIC GASTRECTOMY SLEEVE LAPAROSCOPIC, EGD performed by Sara Mancini DO at [address] [place] [address]

## 2023-06-23 DIAGNOSIS — F41.9 ANXIETY: ICD-10-CM

## 2023-06-23 RX ORDER — BUSPIRONE HYDROCHLORIDE 10 MG/1
TABLET ORAL
Qty: 60 TABLET | Refills: 1 | Status: SHIPPED | OUTPATIENT
Start: 2023-06-23

## 2023-06-28 ENCOUNTER — OFFICE VISIT (OUTPATIENT)
Dept: BARIATRICS/WEIGHT MGMT | Age: 48
End: 2023-06-28
Payer: MEDICAID

## 2023-06-28 VITALS
HEART RATE: 78 BPM | OXYGEN SATURATION: 98 % | WEIGHT: 293 LBS | HEIGHT: 63 IN | DIASTOLIC BLOOD PRESSURE: 78 MMHG | BODY MASS INDEX: 51.91 KG/M2 | SYSTOLIC BLOOD PRESSURE: 120 MMHG

## 2023-06-28 DIAGNOSIS — R55 NEUROCARDIOGENIC SYNCOPE: ICD-10-CM

## 2023-06-28 DIAGNOSIS — Z98.84 STATUS POST LAPAROSCOPIC SLEEVE GASTRECTOMY: ICD-10-CM

## 2023-06-28 DIAGNOSIS — D50.9 IRON DEFICIENCY ANEMIA, UNSPECIFIED IRON DEFICIENCY ANEMIA TYPE: ICD-10-CM

## 2023-06-28 DIAGNOSIS — E66.01 MORBID OBESITY WITH BMI OF 50.0-59.9, ADULT (HCC): ICD-10-CM

## 2023-06-28 DIAGNOSIS — K21.9 GASTROESOPHAGEAL REFLUX DISEASE, UNSPECIFIED WHETHER ESOPHAGITIS PRESENT: Primary | ICD-10-CM

## 2023-06-28 PROCEDURE — 99213 OFFICE O/P EST LOW 20 MIN: CPT | Performed by: NURSE PRACTITIONER

## 2023-08-23 ENCOUNTER — CLINICAL DOCUMENTATION (OUTPATIENT)
Age: 48
End: 2023-08-23

## 2023-08-23 ENCOUNTER — OFFICE VISIT (OUTPATIENT)
Dept: PRIMARY CARE CLINIC | Age: 48
End: 2023-08-23
Payer: COMMERCIAL

## 2023-08-23 ENCOUNTER — HOSPITAL ENCOUNTER (OUTPATIENT)
Age: 48
Setting detail: SPECIMEN
Discharge: HOME | End: 2023-08-23

## 2023-08-23 VITALS
HEART RATE: 84 BPM | BODY MASS INDEX: 52.58 KG/M2 | DIASTOLIC BLOOD PRESSURE: 71 MMHG | WEIGHT: 293 LBS | OXYGEN SATURATION: 100 % | SYSTOLIC BLOOD PRESSURE: 114 MMHG

## 2023-08-23 DIAGNOSIS — Z00.00 ENCOUNTER FOR WELL ADULT EXAM WITHOUT ABNORMAL FINDINGS: Primary | ICD-10-CM

## 2023-08-23 DIAGNOSIS — Z11.3 SCREEN FOR STD (SEXUALLY TRANSMITTED DISEASE): ICD-10-CM

## 2023-08-23 DIAGNOSIS — Z12.11 COLON CANCER SCREENING: ICD-10-CM

## 2023-08-23 DIAGNOSIS — F32.89 OTHER DEPRESSION: ICD-10-CM

## 2023-08-23 PROCEDURE — 99396 PREV VISIT EST AGE 40-64: CPT | Performed by: NURSE PRACTITIONER

## 2023-08-23 RX ORDER — ESCITALOPRAM OXALATE 10 MG/1
10 TABLET ORAL DAILY
Qty: 30 TABLET | Refills: 5 | Status: SHIPPED | OUTPATIENT
Start: 2023-08-23

## 2023-08-23 ASSESSMENT — PATIENT HEALTH QUESTIONNAIRE - PHQ9
3. TROUBLE FALLING OR STAYING ASLEEP: 3
8. MOVING OR SPEAKING SO SLOWLY THAT OTHER PEOPLE COULD HAVE NOTICED. OR THE OPPOSITE, BEING SO FIGETY OR RESTLESS THAT YOU HAVE BEEN MOVING AROUND A LOT MORE THAN USUAL: 0
2. FEELING DOWN, DEPRESSED OR HOPELESS: 2
5. POOR APPETITE OR OVEREATING: 1
10. IF YOU CHECKED OFF ANY PROBLEMS, HOW DIFFICULT HAVE THESE PROBLEMS MADE IT FOR YOU TO DO YOUR WORK, TAKE CARE OF THINGS AT HOME, OR GET ALONG WITH OTHER PEOPLE: 2
6. FEELING BAD ABOUT YOURSELF - OR THAT YOU ARE A FAILURE OR HAVE LET YOURSELF OR YOUR FAMILY DOWN: 3
1. LITTLE INTEREST OR PLEASURE IN DOING THINGS: 0
SUM OF ALL RESPONSES TO PHQ QUESTIONS 1-9: 10
4. FEELING TIRED OR HAVING LITTLE ENERGY: 0
SUM OF ALL RESPONSES TO PHQ QUESTIONS 1-9: 10
9. THOUGHTS THAT YOU WOULD BE BETTER OFF DEAD, OR OF HURTING YOURSELF: 0
SUM OF ALL RESPONSES TO PHQ QUESTIONS 1-9: 10
7. TROUBLE CONCENTRATING ON THINGS, SUCH AS READING THE NEWSPAPER OR WATCHING TELEVISION: 1
SUM OF ALL RESPONSES TO PHQ9 QUESTIONS 1 & 2: 2
SUM OF ALL RESPONSES TO PHQ QUESTIONS 1-9: 10

## 2023-08-23 ASSESSMENT — ENCOUNTER SYMPTOMS
BLOOD IN STOOL: 0
TROUBLE SWALLOWING: 0
DIARRHEA: 0
WHEEZING: 0
VOMITING: 0
SHORTNESS OF BREATH: 0
ABDOMINAL PAIN: 0

## 2023-08-23 NOTE — PATIENT INSTRUCTIONS
[facility] [place] [place] - Physical Therapy   [address] [facility],[address] Floor, [facility]   [phone]   Please call within 48 hours to schedule your appointment. Starting a Weight Loss Plan: Care Instructions  Overview     If you're thinking about losing weight, it can be hard to know where to start. Your doctor can help you set up a weight loss plan that best meets your needs. You may want to take a class on nutrition or exercise, or you could join a weight loss support group. If you have questions about how to make changes to your eating or exercise habits, ask your doctor about seeing a registered dietitian or an exercise specialist.  It can be a big challenge to lose weight. But you don't have to make huge changes at once. Make small changes, and stick with them. When those changes become habit, add a few more changes. If you don't think you're ready to make changes right now, try to pick a date in the future. Make an appointment to see your doctor to discuss whether the time is right for you to start a plan. Follow-up care is a key part of your treatment and safety. Be sure to make and go to all appointments, and call your doctor if you are having problems. It's also a good idea to know your test results and keep a list of the medicines you take. How can you care for yourself at home? Set realistic goals. Many people expect to lose much more weight than is likely. A weight loss of 5% to 10% of your body weight may be enough to improve your health. Get family and friends involved to provide support. Talk to them about why you are trying to lose weight, and ask them to help. They can help by participating in exercise and having meals with you, even if they may be eating something different. Find what works best for you. If you do not have time or do not like to cook, a program that offers meal replacement bars or shakes may be better for you.  Or if you like to prepare meals,

## 2023-08-23 NOTE — PROGRESS NOTES
Warm handoff requested by XOCHITL Burr CNP and was completed. Patient scheduled for future face to face visit.

## 2023-08-23 NOTE — PROGRESS NOTES
SC/IM, 0.5mL 01/09/2019    TDaP, ADACEL (age 6y-58y), BOOSTRIX (age 10y+), IM, 0.5mL 01/09/2019        Health Maintenance   Topic Date Due    Colorectal Cancer Screen  Never done    Flu vaccine (1) 08/01/2023    Depression Monitoring  04/26/2024    Cervical cancer screen  06/29/2026    Lipids  12/09/2027    DTaP/Tdap/Td vaccine (2 - Td or Tdap) 01/09/2029    COVID-19 Vaccine  Completed    Hepatitis C screen  Completed    HIV screen  Completed    Hepatitis A vaccine  Aged Out    Hib vaccine  Aged Out    Meningococcal (ACWY) vaccine  Aged Out    Pneumococcal 0-64 years Vaccine  Aged Out    A1C test (Diabetic or Prediabetic)  Discontinued    Hepatitis B vaccine  Discontinued    Depression Screen  Discontinued     Recommendations for Preventive Services Due: see orders and patient instructions/AVS.    Return in about 1 year (around 8/23/2024).

## 2023-08-24 ENCOUNTER — OFFICE VISIT (OUTPATIENT)
Dept: BEHAVIORAL/MENTAL HEALTH CLINIC | Age: 48
End: 2023-08-24
Payer: COMMERCIAL

## 2023-08-24 DIAGNOSIS — Z11.3 SCREEN FOR STD (SEXUALLY TRANSMITTED DISEASE): ICD-10-CM

## 2023-08-24 DIAGNOSIS — F41.9 ANXIETY: ICD-10-CM

## 2023-08-24 DIAGNOSIS — F33.0 MILD EPISODE OF RECURRENT MAJOR DEPRESSIVE DISORDER (HCC): Primary | ICD-10-CM

## 2023-08-24 PROCEDURE — 90791 PSYCH DIAGNOSTIC EVALUATION: CPT | Performed by: SOCIAL WORKER

## 2023-08-24 PROCEDURE — 1036F TOBACCO NON-USER: CPT | Performed by: SOCIAL WORKER

## 2023-08-24 NOTE — PROGRESS NOTES
ADULT BEHAVIORAL HEALTH ASSESSMENT  Diamante Mancilla AIDELUCY  Licensed Independent        Visit Date: 8/24/2023   Time of appointment: 4:23 PM    Time spent with Patient: 48 minutes. This is patient's first appointment. Reason for Consult:  Anxiety and Depression     PCP:  XOCHITL Cruz CNP      Pt provided informed consent for the behavioral health program. Discussed with patient model of service to include the limits of confidentiality (i.e. abuse reporting, suicide intervention, etc.) and short-term intervention focused approach. Pt indicated understanding. PRESENTING PROBLEM AND HISTORY  Saad Do is a 57 y.o. female who presents for new evaluation and treatment of anxiety, depression. Saad Do presented for assessment, referred by PCP, following multiple stressors. Patient reported she experienced the unexpected loss of her dad unexpectedly, and in the process of losing her dad she was still with her  at the time who she has been living separately from for a long period of time. She expressed she prayed for God to remove her  or fix the situation, when they began having conflict. She reported she found out he was being unfaithful and now has 2 additional children (2yo and 11 month old). She reported she will loves him, but knows he does not love her. She expressed this has been difficult, as she has known him since she was 26years old (got  9 years ago), and they have a long history of being apart through his incarceration and re-uniting upon his time home, until this last time when she reports it was different. Maru shared feeling he is hard to read, saying how much he loves her and helping financially despite their recent martial problems. She recognizes he was doing things that she felt he was trying to control her and she was often told promises for when he returned home without behavior change.  She feels because of this she still owes him some sort

## 2023-08-25 LAB
CHLAMYDIA DNA UR QL NAA+PROBE: NEGATIVE
N GONORRHOEA DNA UR QL NAA+PROBE: NEGATIVE
SPECIMEN DESCRIPTION: NORMAL

## 2023-09-01 ENCOUNTER — TELEPHONE (OUTPATIENT)
Dept: PRIMARY CARE CLINIC | Age: 48
End: 2023-09-01

## 2023-09-01 NOTE — TELEPHONE ENCOUNTER
Patient called stating she had a physical done and didnt bring any papers but states she wanted a note written that she had a physical and cleared to work.  States u have done this before

## 2023-09-13 ENCOUNTER — TELEPHONE (OUTPATIENT)
Dept: BARIATRICS/WEIGHT MGMT | Age: 48
End: 2023-09-13

## 2023-09-13 NOTE — TELEPHONE ENCOUNTER
Patient called office asking to speak with Grace Flores regarding submission for surgery scheduling    Aware pt kevin on the phone at the moment will send message for her to return phone call     Please review

## 2023-09-18 NOTE — TELEPHONE ENCOUNTER
Lvm for patient that [facility] stating she will dis enroll as of 9/30/23. I went in and changed date to 9/26/23 and it accepted it. We will see what happens. I need to know if patient has a different insurance.

## 2024-07-12 ENCOUNTER — HOSPITAL ENCOUNTER (EMERGENCY)
Age: 49
Discharge: HOME | End: 2024-07-12
Attending: EMERGENCY MEDICINE
Payer: COMMERCIAL

## 2024-07-12 VITALS
SYSTOLIC BLOOD PRESSURE: 163 MMHG | RESPIRATION RATE: 16 BRPM | TEMPERATURE: 97.9 F | DIASTOLIC BLOOD PRESSURE: 83 MMHG | OXYGEN SATURATION: 98 % | HEART RATE: 68 BPM | WEIGHT: 235 LBS | BODY MASS INDEX: 41.64 KG/M2 | HEIGHT: 63 IN

## 2024-07-12 DIAGNOSIS — S91.312A LACERATION OF LEFT FOOT, INITIAL ENCOUNTER: Primary | ICD-10-CM

## 2024-07-12 PROCEDURE — 12002 RPR S/N/AX/GEN/TRNK2.6-7.5CM: CPT

## 2024-07-12 PROCEDURE — 99282 EMERGENCY DEPT VISIT SF MDM: CPT

## 2024-07-12 ASSESSMENT — PAIN - FUNCTIONAL ASSESSMENT: PAIN_FUNCTIONAL_ASSESSMENT: NONE - DENIES PAIN

## 2024-07-12 NOTE — ED PROVIDER NOTES
[facility]  Emergency Medicine Department    Pt Name: Maru Potts  MRN: [id number]  Birthdate 1975  Date of evaluation: 7/12/2024  Provider: Yousif Castrejon MD    CHIEF COMPLAINT     Chief Complaint   Patient presents with    Leg Pain     LT heel cut by metal door.        HISTORY OF PRESENT ILLNESS  (Location/Symptom, Timing/Onset, Context/Setting,Quality, Duration, Modifying Factors, Severity.)   Maru Potts is a 48 y.o. female who presents to the emergency department with a laceration to the posterior aspect of her left foot.  Earlier this morning she was leaving the house and the screen door caught her foot as she was walking out.  She put a bandage on and went about her day.  She has noticed that still seems to be  Seeping blood.    Nursing Notes were reviewed.    ALLERGIES     Motrin [ibuprofen] and Shellfish-derived products    CURRENT MEDICATIONS       Previous Medications    BUSPIRONE (BUSPAR) 10 MG TABLET    take 1/2 tablet by mouth twice a day for 4 days then take 1 tablet twice a day for 26 DAYS    CALCIUM CITRATE-VITAMIN D (CALCIUM CITRATE + D3 PO)    Take by mouth    ELASTIC BANDAGES & SUPPORTS (JOBST ANTI-EM KNEE LENGTH LG) MISC    Wear daily while ambulating    ESCITALOPRAM (LEXAPRO) 10 MG TABLET    Take 1 tablet by mouth daily 1/2 tablet once a day for 7 days increase to a whole tablet daily    FAMOTIDINE (PEPCID) 20 MG TABLET    Take 1 tablet by mouth at bedtime    JENNA 24 FE 1-20 MG-MCG(24) TABS    take 1 tablet by mouth once daily    MULTIPLE VITAMINS-MINERALS (THERAPEUTIC MULTIVITAMIN-MINERALS) TABLET    Take 1 tablet by mouth daily    PANTOPRAZOLE (PROTONIX) 40 MG TABLET    Take 1 tablet by mouth daily    VITAMIN D (ERGOCALCIFEROL) 1.25 MG (44004 UT) CAPS CAPSULE    Take 1 capsule by mouth once a week for 8 doses       PAST MEDICAL HISTORY         Diagnosis Date    Anemia 9/18/2014    Chronic back pain     Diabetes mellitus type 2 in obese     On Metformin     not returned as of this dictation.    EMERGENCYDEPARTMENT COURSE and DIFFERENTIAL DIAGNOSIS/MDM:   Vitals:    Vitals:    07/12/24 0032   BP: (!) 163/83   Pulse: 68   Resp: 16   Temp: 97.9 °F (36.6 °C)   TempSrc: Oral   SpO2: 98%   Weight: 106.6 kg (235 lb)   Height: 1.6 m (5' 3\")     48-year-old female with a laceration to the posterior aspect of her left foot.  Given that it is over 8 hours old, I do not feel that suturing is appropriate.  Will clean the wound and place Steri-Strips and glue to hold the edges together.  Will ensure her tetanus is up-to-date and plan for discharge home with wound care instructions.    1)  Number and Complexity of Problems  Problem List This Visit: Foot laceration     Differential Diagnosis: Foot laceration     Diagnoses Considered but Do Not Suspect: None     Pertinent Comorbid Conditions: None     2)  Data Reviewed    Decision Rules/Scores utilized:  N/A    External Documents Reviewed: I have independently reviewed patient's previous medical records including labs, notes and imaging.     Tests considered but not ordered and why: No imaging indicated at this time.    See more data below for the lab and radiology tests and orders.     3)  Treatment and Disposition     Patient repeat assessment: Wound repaired with Steri-Strips and glue.  Patient tolerated this well.  Last tetanus was in 2019 and is thus up-to-date.  Will discharge home.     Disposition discussion with patient/family: Patient aware and agrees with disposition plan.     Case discussed with consulting clinician: N/A     MIPS: N/A     Social determinants of health impacting treatment or disposition:  None    Code Status Discussion:  Not discussed    CONSULTS:  None    PROCEDURES:  Laceration Repair Procedure Note    Indication: Laceration    Procedure: The patient was placed in the appropriate position and anesthesia was not needed as sutures were not being placed. The area was then cleansed with betadine and draped in a

## 2024-07-12 NOTE — ED TRIAGE NOTES
D/c instructions reviewed with patient including wound care. Verbalized understanding. Declined wheelchair. Ambulatory out of dept with independent, steady gait.

## 2025-08-14 ENCOUNTER — TELEPHONE (OUTPATIENT)
Dept: BARIATRICS/WEIGHT MGMT | Age: 50
End: 2025-08-14

## (undated) DEVICE — Device

## (undated) DEVICE — TROCAR ENDOSCP L100MM DIA5MM BLDELSS STBL SL THRD OPT VW

## (undated) DEVICE — Device: Brand: DEFENDO VALVE AND CONNECTOR KIT

## (undated) DEVICE — TROCAR: Brand: KII FIOS FIRST ENTRY

## (undated) DEVICE — GLOVE SURG SZ 65 THK91MIL LTX FREE SYN POLYISOPRENE

## (undated) DEVICE — SOLUTION IV IRRIG WATER 1000ML POUR BRL 2F7114

## (undated) DEVICE — TOWEL,OR,DSP,ST,NATURAL,DLX,4/PK,20PK/CS: Brand: MEDLINE

## (undated) DEVICE — PERRYSBURG ENDO PACK: Brand: MEDLINE INDUSTRIES, INC.

## (undated) DEVICE — SUTURE PERMAHAND SZ 0 L30IN NONABSORBABLE BLK L26MM SH 1/2 K834H

## (undated) DEVICE — GAUZE,SPONGE,FLUFF,6"X6.75",STRL,5/TRAY: Brand: MEDLINE

## (undated) DEVICE — SEAL

## (undated) DEVICE — CANNULA SEAL

## (undated) DEVICE — DRESSING TRNSPAR W5XL4.5IN FLM SHT SEMIPERMEABLE WIND

## (undated) DEVICE — GLOVE ORANGE PI 8   MSG9080

## (undated) DEVICE — RESERVOIR,SUCTION,100CC,SILICONE: Brand: MEDLINE

## (undated) DEVICE — GOWN,AURORA,NONREINFORCED,LARGE: Brand: MEDLINE

## (undated) DEVICE — DRAIN SURG 19FR 100% SIL RADPQ RND CHN FULL FLUT

## (undated) DEVICE — STAPLER 45 RELOAD BLUE: Brand: ENDOWRIST

## (undated) DEVICE — ARM DRAPE

## (undated) DEVICE — SOLUTION ANTIFOG VIS SYS CLEARIFY LAPSCP

## (undated) DEVICE — VESSEL SEALER EXTEND: Brand: ENDOWRIST

## (undated) DEVICE — CHLORAPREP 26ML ORANGE

## (undated) DEVICE — REDUCER: Brand: ENDOWRIST

## (undated) DEVICE — INSUFFLATION TUBING SET WITH FILTER, FUNNEL CONNECTOR AND LUER LOCK: Brand: JOSNOE MEDICAL INC

## (undated) DEVICE — STAPLER 60: Brand: SUREFORM

## (undated) DEVICE — BITEBLOCK 54FR W/ DENT RIM BLOX

## (undated) DEVICE — SUTURE MCRYL + SZ 4 0 L18IN ABSRB UD PC 3 L16MM 3 8 CIR PRIM MCP845G

## (undated) DEVICE — BINDER ABD 3XL H9XL71 82IN E UNISX 3 PNL

## (undated) DEVICE — KENDALL SCD EXPRESS SLEEVES, KNEE LENGTH, MEDIUM: Brand: KENDALL SCD

## (undated) DEVICE — RELOAD STPL L60MM H1.5-3.6MM REG TISS BLU GRIPPING SURF B

## (undated) DEVICE — TROCAR ENDOSCP L100MM DIA12MM BLDELSS OBT RADLUC STBL SL

## (undated) DEVICE — TIP COVER ACCESSORY

## (undated) DEVICE — SKIN AFFIX SURG ADHESIVE 72/CS 0.55ML: Brand: MEDLINE

## (undated) DEVICE — COVER LT HNDL BLU PLAS

## (undated) DEVICE — FORCEPS BX L240CM JAW DIA2.8MM L CAP W/ NDL MIC MESH TOOTH

## (undated) DEVICE — RELOAD STPL H4.1X2MM DIA60MM THCK TISS GRN 6 ROW PWR GST B

## (undated) DEVICE — BLADELESS OBTURATOR: Brand: WECK VISTA

## (undated) DEVICE — GLOVE SURG SZ 6 THK91MIL LTX FREE SYN POLYISOPRENE ANTI

## (undated) DEVICE — SUTURE VIC + SH-13-0 27IN  VCP311H